# Patient Record
Sex: MALE | Race: WHITE | NOT HISPANIC OR LATINO | Employment: OTHER | URBAN - METROPOLITAN AREA
[De-identification: names, ages, dates, MRNs, and addresses within clinical notes are randomized per-mention and may not be internally consistent; named-entity substitution may affect disease eponyms.]

---

## 2023-12-08 DIAGNOSIS — I10 PRIMARY HYPERTENSION: Primary | ICD-10-CM

## 2023-12-08 RX ORDER — LISINOPRIL 10 MG/1
10 TABLET ORAL
COMMUNITY
Start: 2020-11-02 | End: 2023-12-08 | Stop reason: SDUPTHER

## 2023-12-08 RX ORDER — LISINOPRIL 10 MG/1
10 TABLET ORAL DAILY
COMMUNITY
End: 2024-02-06 | Stop reason: ALTCHOICE

## 2023-12-11 RX ORDER — LISINOPRIL 10 MG/1
10 TABLET ORAL
Qty: 90 TABLET | Refills: 3 | Status: SHIPPED | OUTPATIENT
Start: 2023-12-11

## 2024-02-06 ENCOUNTER — OFFICE VISIT (OUTPATIENT)
Dept: CARDIOLOGY | Facility: CLINIC | Age: 64
End: 2024-02-06
Payer: MEDICARE

## 2024-02-06 VITALS
SYSTOLIC BLOOD PRESSURE: 124 MMHG | HEIGHT: 72 IN | TEMPERATURE: 96.5 F | BODY MASS INDEX: 29.66 KG/M2 | WEIGHT: 219 LBS | HEART RATE: 59 BPM | DIASTOLIC BLOOD PRESSURE: 82 MMHG

## 2024-02-06 DIAGNOSIS — R93.3 ABNORMAL FINDINGS ON DIAGNOSTIC IMAGING OF OTHER PARTS OF DIGESTIVE TRACT: ICD-10-CM

## 2024-02-06 DIAGNOSIS — I48.91 ATRIAL FIBRILLATION, UNSPECIFIED TYPE (MULTI): ICD-10-CM

## 2024-02-06 DIAGNOSIS — R94.31 ABNORMAL EKG: Primary | ICD-10-CM

## 2024-02-06 PROCEDURE — 99214 OFFICE O/P EST MOD 30 MIN: CPT | Performed by: INTERNAL MEDICINE

## 2024-02-06 RX ORDER — IBUPROFEN 400 MG/1
400 TABLET ORAL EVERY 8 HOURS PRN
COMMUNITY
Start: 2018-08-16

## 2024-02-06 RX ORDER — IBUPROFEN 600 MG/1
600 TABLET ORAL EVERY 6 HOURS PRN
COMMUNITY
Start: 2023-06-01

## 2024-02-06 ASSESSMENT — PAIN SCALES - GENERAL: PAINLEVEL: 6

## 2024-02-06 NOTE — PROGRESS NOTES
1. A. fib/flutter in the setting of acute illness  2. Obstructive sleep apnea/CPAP  3. Degenerative disc disease  4. Osteoarthritis    Patient is a pleasant 63-year-old male who presents today for follow-up he has no complaints exertional chest pain or shortness of breath he states that winter months he is not as active though he states that he plans to have a few pounds of weight loss and plans to join gymnasium, he has no complaints of orthopnea PND he denies any complaints of palpitation and he continues to use CPAP for his obstructive sleep apnea.    BMI 29.7 previously at 28.7, blood pressure 124/82 mmHg and a heart rate of 59 bpm patient is a well-developed well-nourished male in no acute distress speaking full sentences no carotid bruits upstroke and volumes and central venous pressures are normal, heart rate and rhythm are regular S1-S2 are normal intensity no gallop or murmurs are appreciated lungs clear to auscultation abdomen is obese positive bowel sounds soft and nontender, and the lower extremities have no edema    February 2023  Total cholesterol 218, triglycerides 67, HDL 71, and LDL of 134    Atrial fibrillation flutter in the setting of an acute illness with no clinical recurrence his 2 weeks of continuous cardiac monitoring revealed no evidence of atrial fibrillation we will continue with clinical follow-up  Overweight heart healthy diet and weight loss activity and goal of 150 minutes of moderate exercise per week was discussed and details provided  Blood pressure acceptable under good control  Will provide patient with requisition for CBC CMP and a lipid panel will notify patient of the test results via the phone  Return to my clinic in 1 year.

## 2024-03-14 DIAGNOSIS — Z00.00 ENCOUNTER FOR MEDICARE ANNUAL WELLNESS EXAM: ICD-10-CM

## 2024-03-14 DIAGNOSIS — I48.0 PAROXYSMAL ATRIAL FIBRILLATION (MULTI): ICD-10-CM

## 2024-03-14 DIAGNOSIS — E78.5 HYPERLIPIDEMIA, UNSPECIFIED HYPERLIPIDEMIA TYPE: Primary | ICD-10-CM

## 2024-03-14 DIAGNOSIS — Z12.5 SCREENING FOR PROSTATE CANCER: ICD-10-CM

## 2024-03-14 DIAGNOSIS — I10 ESSENTIAL HYPERTENSION, BENIGN: ICD-10-CM

## 2024-03-14 DIAGNOSIS — D69.6 THROMBOCYTOPENIA (CMS-HCC): ICD-10-CM

## 2024-03-14 PROBLEM — G89.29 CHRONIC HEADACHES: Status: ACTIVE | Noted: 2024-03-14

## 2024-03-14 PROBLEM — Z86.0100 HISTORY OF COLONIC POLYPS: Status: ACTIVE | Noted: 2024-03-14

## 2024-03-14 PROBLEM — G47.00 INSOMNIA: Status: ACTIVE | Noted: 2024-03-14

## 2024-03-14 PROBLEM — M43.6 STIFFNESS OF CERVICAL SPINE: Status: ACTIVE | Noted: 2024-03-14

## 2024-03-14 PROBLEM — R01.1 MURMUR: Status: ACTIVE | Noted: 2023-05-18

## 2024-03-14 PROBLEM — Z86.79 HISTORY OF ATRIAL FIBRILLATION: Status: ACTIVE | Noted: 2024-03-14

## 2024-03-14 PROBLEM — R53.81 MALAISE: Status: ACTIVE | Noted: 2024-03-14

## 2024-03-14 PROBLEM — G47.33 OBSTRUCTIVE SLEEP APNEA SYNDROME: Status: ACTIVE | Noted: 2023-05-18

## 2024-03-14 PROBLEM — K21.00 GASTRO-ESOPHAGEAL REFLUX DISEASE WITH ESOPHAGITIS: Status: ACTIVE | Noted: 2024-03-14

## 2024-03-14 PROBLEM — M47.816 LUMBAR SPONDYLOSIS: Status: ACTIVE | Noted: 2024-03-14

## 2024-03-14 PROBLEM — E87.6 HYPOKALEMIA: Status: ACTIVE | Noted: 2019-08-22

## 2024-03-14 PROBLEM — M10.9 GOUT: Status: ACTIVE | Noted: 2024-03-14

## 2024-03-14 PROBLEM — R51.9 CHRONIC HEADACHES: Status: ACTIVE | Noted: 2024-03-14

## 2024-03-14 PROBLEM — Z86.19 HISTORY OF SEPSIS: Status: ACTIVE | Noted: 2024-03-14

## 2024-03-14 PROBLEM — R00.1 BRADYCARDIA: Status: ACTIVE | Noted: 2024-03-14

## 2024-03-14 PROBLEM — G43.909 MIGRAINE: Status: ACTIVE | Noted: 2024-03-14

## 2024-03-14 PROBLEM — C44.310 BASAL CELL CARCINOMA OF FACE: Status: ACTIVE | Noted: 2024-03-14

## 2024-03-14 PROBLEM — I50.21 ACUTE SYSTOLIC CONGESTIVE HEART FAILURE (MULTI): Status: ACTIVE | Noted: 2019-08-19

## 2024-03-14 PROBLEM — Z86.010 HISTORY OF COLONIC POLYPS: Status: ACTIVE | Noted: 2024-03-14

## 2024-03-22 ENCOUNTER — LAB (OUTPATIENT)
Dept: LAB | Facility: LAB | Age: 64
End: 2024-03-22
Payer: MEDICARE

## 2024-03-22 DIAGNOSIS — D69.6 THROMBOCYTOPENIA (CMS-HCC): ICD-10-CM

## 2024-03-22 DIAGNOSIS — Z00.00 ENCOUNTER FOR MEDICARE ANNUAL WELLNESS EXAM: ICD-10-CM

## 2024-03-22 DIAGNOSIS — E78.5 HYPERLIPIDEMIA, UNSPECIFIED HYPERLIPIDEMIA TYPE: ICD-10-CM

## 2024-03-22 DIAGNOSIS — I48.0 PAROXYSMAL ATRIAL FIBRILLATION (MULTI): ICD-10-CM

## 2024-03-22 DIAGNOSIS — I10 ESSENTIAL HYPERTENSION, BENIGN: ICD-10-CM

## 2024-03-22 DIAGNOSIS — Z12.5 SCREENING FOR PROSTATE CANCER: ICD-10-CM

## 2024-03-22 DIAGNOSIS — R93.3 ABNORMAL FINDINGS ON DIAGNOSTIC IMAGING OF OTHER PARTS OF DIGESTIVE TRACT: ICD-10-CM

## 2024-03-22 DIAGNOSIS — I48.91 ATRIAL FIBRILLATION, UNSPECIFIED TYPE (MULTI): ICD-10-CM

## 2024-03-22 DIAGNOSIS — R94.31 ABNORMAL EKG: ICD-10-CM

## 2024-03-22 LAB
ALBUMIN SERPL BCP-MCNC: 4.5 G/DL (ref 3.4–5)
ALP SERPL-CCNC: 52 U/L (ref 33–136)
ALT SERPL W P-5'-P-CCNC: 11 U/L (ref 10–52)
ANION GAP SERPL CALC-SCNC: 15 MMOL/L (ref 10–20)
AST SERPL W P-5'-P-CCNC: 14 U/L (ref 9–39)
BASOPHILS # BLD AUTO: 0.02 X10*3/UL (ref 0–0.1)
BASOPHILS NFR BLD AUTO: 0.4 %
BILIRUB SERPL-MCNC: 1.7 MG/DL (ref 0–1.2)
BUN SERPL-MCNC: 16 MG/DL (ref 6–23)
CALCIUM SERPL-MCNC: 9.9 MG/DL (ref 8.6–10.6)
CHLORIDE SERPL-SCNC: 102 MMOL/L (ref 98–107)
CHOLEST SERPL-MCNC: 208 MG/DL (ref 0–199)
CHOLESTEROL/HDL RATIO: 3.3
CO2 SERPL-SCNC: 27 MMOL/L (ref 21–32)
CREAT SERPL-MCNC: 0.8 MG/DL (ref 0.5–1.3)
EGFRCR SERPLBLD CKD-EPI 2021: >90 ML/MIN/1.73M*2
EOSINOPHIL # BLD AUTO: 0.12 X10*3/UL (ref 0–0.7)
EOSINOPHIL NFR BLD AUTO: 2.5 %
ERYTHROCYTE [DISTWIDTH] IN BLOOD BY AUTOMATED COUNT: 13.5 % (ref 11.5–14.5)
GLUCOSE SERPL-MCNC: 95 MG/DL (ref 74–99)
HCT VFR BLD AUTO: 46.3 % (ref 41–52)
HDLC SERPL-MCNC: 63.1 MG/DL
HGB BLD-MCNC: 15.5 G/DL (ref 13.5–17.5)
IMM GRANULOCYTES # BLD AUTO: 0.02 X10*3/UL (ref 0–0.7)
IMM GRANULOCYTES NFR BLD AUTO: 0.4 % (ref 0–0.9)
LDLC SERPL CALC-MCNC: 125 MG/DL
LYMPHOCYTES # BLD AUTO: 1.81 X10*3/UL (ref 1.2–4.8)
LYMPHOCYTES NFR BLD AUTO: 37.5 %
MCH RBC QN AUTO: 31.4 PG (ref 26–34)
MCHC RBC AUTO-ENTMCNC: 33.5 G/DL (ref 32–36)
MCV RBC AUTO: 94 FL (ref 80–100)
MONOCYTES # BLD AUTO: 0.44 X10*3/UL (ref 0.1–1)
MONOCYTES NFR BLD AUTO: 9.1 %
NEUTROPHILS # BLD AUTO: 2.42 X10*3/UL (ref 1.2–7.7)
NEUTROPHILS NFR BLD AUTO: 50.1 %
NON HDL CHOLESTEROL: 145 MG/DL (ref 0–149)
NRBC BLD-RTO: 0 /100 WBCS (ref 0–0)
PLATELET # BLD AUTO: 179 X10*3/UL (ref 150–450)
POTASSIUM SERPL-SCNC: 4.6 MMOL/L (ref 3.5–5.3)
PROT SERPL-MCNC: 7.4 G/DL (ref 6.4–8.2)
PSA SERPL-MCNC: 3.88 NG/ML
RBC # BLD AUTO: 4.94 X10*6/UL (ref 4.5–5.9)
SODIUM SERPL-SCNC: 139 MMOL/L (ref 136–145)
TRIGL SERPL-MCNC: 102 MG/DL (ref 0–149)
TSH SERPL-ACNC: 1.05 MIU/L (ref 0.44–3.98)
VLDL: 20 MG/DL (ref 0–40)
WBC # BLD AUTO: 4.8 X10*3/UL (ref 4.4–11.3)

## 2024-03-22 PROCEDURE — G0103 PSA SCREENING: HCPCS

## 2024-03-22 PROCEDURE — 84443 ASSAY THYROID STIM HORMONE: CPT

## 2024-03-22 PROCEDURE — 36415 COLL VENOUS BLD VENIPUNCTURE: CPT

## 2024-03-22 PROCEDURE — 80061 LIPID PANEL: CPT

## 2024-03-22 PROCEDURE — 85025 COMPLETE CBC W/AUTO DIFF WBC: CPT

## 2024-03-22 PROCEDURE — 80053 COMPREHEN METABOLIC PANEL: CPT

## 2024-03-28 ENCOUNTER — OFFICE VISIT (OUTPATIENT)
Dept: PRIMARY CARE | Facility: CLINIC | Age: 64
End: 2024-03-28
Payer: MEDICARE

## 2024-03-28 VITALS
HEART RATE: 74 BPM | SYSTOLIC BLOOD PRESSURE: 124 MMHG | TEMPERATURE: 98.4 F | DIASTOLIC BLOOD PRESSURE: 82 MMHG | BODY MASS INDEX: 31.21 KG/M2 | OXYGEN SATURATION: 96 % | HEIGHT: 70 IN | WEIGHT: 218 LBS

## 2024-03-28 DIAGNOSIS — I10 PRIMARY HYPERTENSION: ICD-10-CM

## 2024-03-28 DIAGNOSIS — Z00.00 HEALTH MAINTENANCE EXAMINATION: Primary | ICD-10-CM

## 2024-03-28 PROBLEM — I50.21 ACUTE SYSTOLIC CONGESTIVE HEART FAILURE (MULTI): Status: RESOLVED | Noted: 2019-08-19 | Resolved: 2024-03-28

## 2024-03-28 PROBLEM — D69.6 THROMBOCYTOPENIA (CMS-HCC): Status: RESOLVED | Noted: 2019-08-19 | Resolved: 2024-03-28

## 2024-03-28 PROCEDURE — G0439 PPPS, SUBSEQ VISIT: HCPCS | Performed by: FAMILY MEDICINE

## 2024-03-28 PROCEDURE — 1036F TOBACCO NON-USER: CPT | Performed by: FAMILY MEDICINE

## 2024-03-28 PROCEDURE — 3079F DIAST BP 80-89 MM HG: CPT | Performed by: FAMILY MEDICINE

## 2024-03-28 PROCEDURE — 3074F SYST BP LT 130 MM HG: CPT | Performed by: FAMILY MEDICINE

## 2024-03-28 RX ORDER — LISINOPRIL 10 MG/1
10 TABLET ORAL
Qty: 90 TABLET | Refills: 3 | Status: CANCELLED | OUTPATIENT
Start: 2024-03-28

## 2024-03-28 ASSESSMENT — PATIENT HEALTH QUESTIONNAIRE - PHQ9
SUM OF ALL RESPONSES TO PHQ9 QUESTIONS 1 AND 2: 0
1. LITTLE INTEREST OR PLEASURE IN DOING THINGS: NOT AT ALL
2. FEELING DOWN, DEPRESSED OR HOPELESS: NOT AT ALL

## 2024-03-28 ASSESSMENT — ACTIVITIES OF DAILY LIVING (ADL)
TAKING_MEDICATION: INDEPENDENT
DRESSING: INDEPENDENT
MANAGING_FINANCES: INDEPENDENT
DOING_HOUSEWORK: INDEPENDENT
BATHING: INDEPENDENT
GROCERY_SHOPPING: INDEPENDENT

## 2024-03-28 NOTE — PROGRESS NOTES
Subjective   Patient ID: Jacob Boone is a 63 y.o. male who presents for Medicare Annual Wellness Visit Subsequent (EP. Here for AMCR.).  HPI  Feels well, no specific complaints or concerns today.    [facility]     Sees chiropractor for his neck pain and low back pain    Dr. Guido is his cardiologist     Review of Systems  General: no fever or night sweats, no change in weight  Eyes: no vision disturbance  ENT: no hearing loss, no hoarseness, no mouth lesions, no sore throat, and no dysphagia  CV: no chest pain, no palpitations, no lower extremity edema  Resp: no shortness of breath, no cough  GI: no abdominal pain, no change in bowel habits  : no urinary problems  Skin; no rashes or new/changed skin lesions- has skin screening regularly [facility]   Neuro: no headache, no difficulty walking     Objective   Visit Vitals  /82   Pulse 74   Temp 36.9 °C (98.4 °F) (Oral)      Physical Exam  Appears well.    HEENT: OP clear. Sclera white. PERRL. EACs and TMs clear.    Neck: Supple, no masses, lymphadenopathy, or thyromegaly.    CVS: RRR, no murmurs. No peripheral edema.    Resp: Clear and equal breath sounds. Normal inspirations and expirations.    Abd: Soft, non-tender, no mass or HSM.    Male: Normal testes. No hernia.    Rectal: No mass. Guaiac negative stool. Normal prostate.    Skin: No suspicious lesions.    Assessment/Plan   Diagnoses and all orders for this visit:  Health maintenance examination  Primary hypertension      Anaid Thorpe MD  Family Medicine   [facility]

## 2024-11-06 ENCOUNTER — OFFICE VISIT (OUTPATIENT)
Dept: PRIMARY CARE | Facility: CLINIC | Age: 64
End: 2024-11-06
Payer: MEDICARE

## 2024-11-06 ENCOUNTER — TELEPHONE (OUTPATIENT)
Dept: PRIMARY CARE | Facility: CLINIC | Age: 64
End: 2024-11-06

## 2024-11-06 VITALS
BODY MASS INDEX: 31.71 KG/M2 | TEMPERATURE: 98.4 F | SYSTOLIC BLOOD PRESSURE: 142 MMHG | DIASTOLIC BLOOD PRESSURE: 82 MMHG | OXYGEN SATURATION: 95 % | WEIGHT: 221 LBS | HEART RATE: 54 BPM

## 2024-11-06 DIAGNOSIS — M54.42 ACUTE BILATERAL LOW BACK PAIN WITH BILATERAL SCIATICA: Primary | ICD-10-CM

## 2024-11-06 DIAGNOSIS — M54.41 ACUTE BILATERAL LOW BACK PAIN WITH BILATERAL SCIATICA: Primary | ICD-10-CM

## 2024-11-06 PROCEDURE — 3077F SYST BP >= 140 MM HG: CPT | Performed by: FAMILY MEDICINE

## 2024-11-06 PROCEDURE — 3079F DIAST BP 80-89 MM HG: CPT | Performed by: FAMILY MEDICINE

## 2024-11-06 PROCEDURE — 99213 OFFICE O/P EST LOW 20 MIN: CPT | Performed by: FAMILY MEDICINE

## 2024-11-06 RX ORDER — SULINDAC 200 MG/1
200 TABLET ORAL 2 TIMES DAILY
Qty: 30 TABLET | Refills: 0 | Status: SHIPPED | OUTPATIENT
Start: 2024-11-06 | End: 2025-01-05

## 2024-11-06 RX ORDER — PREDNISONE 20 MG/1
TABLET ORAL
Qty: 18 TABLET | Refills: 0 | Status: SHIPPED | OUTPATIENT
Start: 2024-11-06 | End: 2024-11-06

## 2024-11-06 RX ORDER — PREDNISONE 20 MG/1
TABLET ORAL
Qty: 18 TABLET | Refills: 0 | Status: SHIPPED | OUTPATIENT
Start: 2024-11-06

## 2024-11-06 RX ORDER — SULINDAC 200 MG/1
200 TABLET ORAL 2 TIMES DAILY
Qty: 30 TABLET | Refills: 0 | Status: SHIPPED | OUTPATIENT
Start: 2024-11-06 | End: 2024-11-06

## 2024-11-06 NOTE — PROGRESS NOTES
Subjective   Patient ID: Jacob Boone is a 64 y.o. male who presents for Back Pain (EP. Here for low back pain radiating down both legs. /).  HPI  C/o tightness down back of legs, been having a difficult time walking for the last month     Sees Dr. Jauregui once a week for manipulation     Has sauna and hot tub at home     [facility]es [facility] (for the last 6 mos or so)    Has seen Dr. Fredy Stratton in the past for cervical spine issues  (2018)  Review of Systems  Genl: The patient has been in good health without recent weight change, fevers, or night sweats  CVS: No chest pain, irregular heartbeat, shortness of breath, or swollen ankles  Resp: No cough or difficulty breathing  GI: No change in bowel habits or abdominal pain. No heartburn  : No urinary problems.   Objective   /82 (BP Location: Left arm)   Pulse 54   Temp 36.9 °C (98.4 °F) (Oral)   Wt 100 kg (221 lb)   SpO2 95%   BMI 31.71 kg/m²      Assessment/Plan   Diagnoses and all orders for this visit:  Acute bilateral low back pain with bilateral sciatica  -     Referral to Orthopaedic Surgery; Future  -     predniSONE (Deltasone) 20 mg tablet; Take 3 tabs for 3 days, 2 tabs for 3 days, and 1 tab for 3 days  -     sulindac (Clinoril) 200 mg tablet; Take 1 tablet (200 mg) by mouth 2 times a day. To start after finishing course of prednisone    Patient to take course of prednisone (x 9 days) and then start sulindac    Anaid Thorpe MD  Family Medicine   [facility]

## 2024-11-20 ENCOUNTER — OFFICE VISIT (OUTPATIENT)
Dept: ORTHOPEDIC SURGERY | Facility: CLINIC | Age: 64
End: 2024-11-20
Payer: MEDICARE

## 2024-11-20 VITALS — BODY MASS INDEX: 29.93 KG/M2 | WEIGHT: 221 LBS | HEIGHT: 72 IN

## 2024-11-20 DIAGNOSIS — M48.062 SPINAL STENOSIS OF LUMBAR REGION WITH NEUROGENIC CLAUDICATION: Primary | ICD-10-CM

## 2024-11-20 PROCEDURE — 1036F TOBACCO NON-USER: CPT | Performed by: PHYSICIAN ASSISTANT

## 2024-11-20 PROCEDURE — 3008F BODY MASS INDEX DOCD: CPT | Performed by: PHYSICIAN ASSISTANT

## 2024-11-20 PROCEDURE — 99204 OFFICE O/P NEW MOD 45 MIN: CPT | Performed by: PHYSICIAN ASSISTANT

## 2024-11-20 PROCEDURE — 99214 OFFICE O/P EST MOD 30 MIN: CPT | Performed by: PHYSICIAN ASSISTANT

## 2024-11-20 ASSESSMENT — PAIN - FUNCTIONAL ASSESSMENT: PAIN_FUNCTIONAL_ASSESSMENT: 0-10

## 2024-11-20 NOTE — PROGRESS NOTES
Jacob is a 64-year-old male reporting clinic today for evaluation of his chronic low back pain with radiculopathy.    His low back pain has been ongoing for approximately the last 4 years, he denies injury or trauma.  He has midline low back pain that radiates into beltline distribution.  His radicular symptoms started more recently, he has pain that radiates down his posterior legs into his calves, he describes this as severe spasming/tightening.  He is having increased difficulty walking distances, he is nearly cut his step down in half since the symptoms started.  He denies weakness, dragging or tripping over his feet.  He has full control of his bowel and bladder.    Treatment thus far has consisted of prednisone and sulindac prescribed by his PCP.  He has been seeing a chiropractor and going to a stretch lab as needed.  The stretch lab has significantly helped his neck pain.  He has a history of a previous cervical spine surgery with Dr. Stratton.    Family, social, and medical histories are obtained and reviewed.    ROS: All other systems have been reviewed and are negative except as previously noted in history of present illness.    Physical Exam:  Const: Well-appearing, well-nourished male in no distress.  Eyes: Normal appearing sclera and conjunctiva, no jaundice, pupils normal in appearance.  Resp: breathing comfortably, normal respiratory rate.  CV: No upper or lower extremity edema.  Musculoskeletal: Normal gait.  Lumbar ROM is supple.  Strength exam of the lower extremities reveals 5/5 strength in all major muscle groups.  Negative straight leg raise bilaterally.  Neuro: Sensation is intact and equal bilaterally. Deep tendon reflexes are normal and symmetric.  No clonus.  Skin: Intact without any lesions, normal turgor.  Psych: Alert and oriented x3, normal mood and affect.    The plan moving forward is to further evaluate his symptoms of lumbar stenosis with radiculopathy and neurogenic claudication.   Plain films ordered to assess for instability.  An MRI of the lumbar spine was ordered for lumbar stenosis.  He was also given a referral for some physical therapy.  He will follow-up with me once his imaging is complete to discuss further treatment options, possibly including injections versus surgical intervention.    **This note was dictated using speech recognition software and was not corrected for spelling or grammatical errors**

## 2024-12-03 ENCOUNTER — HOSPITAL ENCOUNTER (OUTPATIENT)
Dept: RADIOLOGY | Facility: CLINIC | Age: 64
Discharge: HOME | End: 2024-12-03
Payer: MEDICARE

## 2024-12-03 DIAGNOSIS — M48.062 SPINAL STENOSIS OF LUMBAR REGION WITH NEUROGENIC CLAUDICATION: ICD-10-CM

## 2024-12-03 PROCEDURE — 72148 MRI LUMBAR SPINE W/O DYE: CPT | Performed by: RADIOLOGY

## 2024-12-03 PROCEDURE — 72148 MRI LUMBAR SPINE W/O DYE: CPT

## 2024-12-09 ENCOUNTER — OFFICE VISIT (OUTPATIENT)
Dept: ORTHOPEDIC SURGERY | Facility: CLINIC | Age: 64
End: 2024-12-09
Payer: MEDICARE

## 2024-12-09 DIAGNOSIS — M48.062 NEUROGENIC CLAUDICATION DUE TO LUMBAR SPINAL STENOSIS: Primary | ICD-10-CM

## 2024-12-09 PROCEDURE — 99213 OFFICE O/P EST LOW 20 MIN: CPT | Performed by: ORTHOPAEDIC SURGERY

## 2024-12-09 PROCEDURE — 1036F TOBACCO NON-USER: CPT | Performed by: ORTHOPAEDIC SURGERY

## 2024-12-09 ASSESSMENT — PAIN - FUNCTIONAL ASSESSMENT: PAIN_FUNCTIONAL_ASSESSMENT: 0-10

## 2024-12-09 ASSESSMENT — PAIN DESCRIPTION - DESCRIPTORS: DESCRIPTORS: ACHING

## 2024-12-09 ASSESSMENT — PAIN SCALES - GENERAL: PAINLEVEL_OUTOF10: 4

## 2024-12-09 NOTE — PROGRESS NOTES
Patient returns for follow-up.  He is 7 years status post cervical discectomy and fusion with me, he did very well following that operation.  He was recently seen and worked up by Cassy Lo for lumbar stenosis, radicular pain in both legs with claudication.  This has been progressively worsening over the last several months.  His quality of life is really suffering.    He is scheduled to begin physical therapy soon.  He has not had any other formal treatment.    He does not have any focal deficits on exam.  Is able to heel/toe walk without difficulty.    I reviewed MRI personally.  This shows severe central stenosis at L4-5 with grade 1 spondylolisthesis.    We discussed conservative care at this point.  Recommend attempting physical therapy.  He can use over-the-counter medications as well for right now.  If he does not improve with physical therapy would be an excellent candidate for lumbar spine surgery.    If he does not do well with therapy would like to discuss surgery over the phone he can just call the office.  Otherwise I am happy to see him back on an as-needed basis.    *This note was dictated using speech recognition software and was not corrected for spelling or grammatical errors*

## 2024-12-13 DIAGNOSIS — M48.062 SPINAL STENOSIS OF LUMBAR REGION WITH NEUROGENIC CLAUDICATION: ICD-10-CM

## 2024-12-26 ENCOUNTER — APPOINTMENT (OUTPATIENT)
Dept: PHYSICAL THERAPY | Facility: CLINIC | Age: 64
End: 2024-12-26
Payer: MEDICARE

## 2025-01-06 ENCOUNTER — PREP FOR PROCEDURE (OUTPATIENT)
Dept: ORTHOPEDIC SURGERY | Facility: CLINIC | Age: 65
End: 2025-01-06
Payer: MEDICARE

## 2025-01-06 DIAGNOSIS — R79.1 ABNORMAL COAGULATION PROFILE: ICD-10-CM

## 2025-01-06 DIAGNOSIS — M48.062 NEUROGENIC CLAUDICATION DUE TO LUMBAR SPINAL STENOSIS: Primary | ICD-10-CM

## 2025-01-06 DIAGNOSIS — M54.16 LUMBAR RADICULOPATHY: ICD-10-CM

## 2025-01-06 RX ORDER — GABAPENTIN 300 MG/1
600 CAPSULE ORAL ONCE
OUTPATIENT
Start: 2025-01-06 | End: 2025-01-06

## 2025-01-06 RX ORDER — ACETAMINOPHEN 325 MG/1
975 TABLET ORAL ONCE
OUTPATIENT
Start: 2025-01-06 | End: 2025-01-06

## 2025-01-06 RX ORDER — CEFAZOLIN SODIUM 2 G/100ML
2 INJECTION, SOLUTION INTRAVENOUS ONCE
OUTPATIENT
Start: 2025-01-06 | End: 2025-01-06

## 2025-01-06 NOTE — H&P
Spoke with the patient on telephone today.  He has completed physical therapy.  He continues to have miserable radicular pain and neurogenic claudication.  His wife had to postpone her knee replacement surgery because he is incapable of taking care of her at this current symptoms at this point.    MRI demonstrates grade 1 spondylolisthesis at L4-5 with critical central stenosis.    I discussed the risks of surgery including bleeding, infection, paralysis, muscle weakness, CSF leak, bowel or bladder dysfunction, incomplete resolution of pain or numbness, DVT/PE, heart attack, stroke, and other unforeseen medical and anesthesia complications.  I also explained that the typical success rates for operation such as this fall in the 80-85% range, and that there is a small chance that there will be no improvement, or even less commonly, worsening of the preoperative symptoms.  He verbalized understanding of the risks, benefits, and alternatives to surgical treatment.  The plan will be for L4-5 minimally invasive decompression and interbody fusion.  Surgery was scheduled for January 23 at [facility].      *This note was dictated using speech recognition software and was not corrected for spelling or grammatical errors*

## 2025-01-17 ENCOUNTER — PRE-ADMISSION TESTING (OUTPATIENT)
Dept: PREADMISSION TESTING | Facility: HOSPITAL | Age: 65
End: 2025-01-17
Payer: MEDICARE

## 2025-01-17 VITALS
WEIGHT: 222 LBS | DIASTOLIC BLOOD PRESSURE: 79 MMHG | SYSTOLIC BLOOD PRESSURE: 158 MMHG | RESPIRATION RATE: 16 BRPM | BODY MASS INDEX: 30.07 KG/M2 | HEIGHT: 72 IN | TEMPERATURE: 97.2 F | HEART RATE: 57 BPM | OXYGEN SATURATION: 100 %

## 2025-01-17 DIAGNOSIS — R79.1 ABNORMAL COAGULATION PROFILE: ICD-10-CM

## 2025-01-17 DIAGNOSIS — M48.062 NEUROGENIC CLAUDICATION DUE TO LUMBAR SPINAL STENOSIS: ICD-10-CM

## 2025-01-17 DIAGNOSIS — R73.09 ABNORMAL GLUCOSE: ICD-10-CM

## 2025-01-17 DIAGNOSIS — M54.16 LUMBAR RADICULOPATHY: ICD-10-CM

## 2025-01-17 DIAGNOSIS — Z01.818 PREOP TESTING: Primary | ICD-10-CM

## 2025-01-17 LAB
ABO GROUP (TYPE) IN BLOOD: NORMAL
ANION GAP SERPL CALCULATED.3IONS-SCNC: 9 MMOL/L (ref 10–20)
ANTIBODY SCREEN: NORMAL
APTT PPP: 27.6 SECONDS (ref 22–32.5)
ATRIAL RATE: 54 BPM
BASOPHILS # BLD AUTO: 0.04 X10*3/UL (ref 0–0.1)
BASOPHILS NFR BLD AUTO: 0.9 %
BUN SERPL-MCNC: 18 MG/DL (ref 6–23)
CALCIUM SERPL-MCNC: 9.4 MG/DL (ref 8.6–10.3)
CHLORIDE SERPL-SCNC: 104 MMOL/L (ref 98–107)
CO2 SERPL-SCNC: 28 MMOL/L (ref 21–32)
CREAT SERPL-MCNC: 0.84 MG/DL (ref 0.5–1.3)
EGFRCR SERPLBLD CKD-EPI 2021: >90 ML/MIN/1.73M*2
EOSINOPHIL # BLD AUTO: 0.11 X10*3/UL (ref 0–0.7)
EOSINOPHIL NFR BLD AUTO: 2.4 %
ERYTHROCYTE [DISTWIDTH] IN BLOOD BY AUTOMATED COUNT: 13.2 % (ref 11.5–14.5)
EST. AVERAGE GLUCOSE BLD GHB EST-MCNC: 88 MG/DL
GLUCOSE SERPL-MCNC: 98 MG/DL (ref 74–99)
HBA1C MFR BLD: 4.7 %
HCT VFR BLD AUTO: 44 % (ref 41–52)
HGB BLD-MCNC: 14.9 G/DL (ref 13.5–17.5)
IMM GRANULOCYTES # BLD AUTO: 0.01 X10*3/UL (ref 0–0.7)
IMM GRANULOCYTES NFR BLD AUTO: 0.2 % (ref 0–0.9)
INR PPP: 1 (ref 0.9–1.2)
LYMPHOCYTES # BLD AUTO: 1.85 X10*3/UL (ref 1.2–4.8)
LYMPHOCYTES NFR BLD AUTO: 40.7 %
MCH RBC QN AUTO: 31.4 PG (ref 26–34)
MCHC RBC AUTO-ENTMCNC: 33.9 G/DL (ref 32–36)
MCV RBC AUTO: 93 FL (ref 80–100)
MONOCYTES # BLD AUTO: 0.46 X10*3/UL (ref 0.1–1)
MONOCYTES NFR BLD AUTO: 10.1 %
NEUTROPHILS # BLD AUTO: 2.07 X10*3/UL (ref 1.2–7.7)
NEUTROPHILS NFR BLD AUTO: 45.7 %
NRBC BLD-RTO: 0 /100 WBCS (ref 0–0)
P AXIS: 39 DEGREES
P OFFSET: 154 MS
P ONSET: 103 MS
PLATELET # BLD AUTO: 183 X10*3/UL (ref 150–450)
POTASSIUM SERPL-SCNC: 4.7 MMOL/L (ref 3.5–5.3)
PR INTERVAL: 222 MS
PROTHROMBIN TIME: 10 SECONDS (ref 9.3–12.7)
Q ONSET: 214 MS
QRS COUNT: 9 BEATS
QRS DURATION: 110 MS
QT INTERVAL: 382 MS
QTC CALCULATION(BAZETT): 362 MS
QTC FREDERICIA: 368 MS
R AXIS: 27 DEGREES
RBC # BLD AUTO: 4.75 X10*6/UL (ref 4.5–5.9)
RH FACTOR (ANTIGEN D): NORMAL
SODIUM SERPL-SCNC: 136 MMOL/L (ref 136–145)
T AXIS: 13 DEGREES
T OFFSET: 405 MS
VENTRICULAR RATE: 54 BPM
WBC # BLD AUTO: 4.5 X10*3/UL (ref 4.4–11.3)

## 2025-01-17 PROCEDURE — 93005 ELECTROCARDIOGRAM TRACING: CPT

## 2025-01-17 PROCEDURE — 83036 HEMOGLOBIN GLYCOSYLATED A1C: CPT | Mod: TRILAB

## 2025-01-17 PROCEDURE — 86900 BLOOD TYPING SEROLOGIC ABO: CPT

## 2025-01-17 PROCEDURE — 93010 ELECTROCARDIOGRAM REPORT: CPT | Performed by: INTERNAL MEDICINE

## 2025-01-17 PROCEDURE — 87081 CULTURE SCREEN ONLY: CPT | Mod: TRILAB

## 2025-01-17 PROCEDURE — 99203 OFFICE O/P NEW LOW 30 MIN: CPT | Performed by: NURSE PRACTITIONER

## 2025-01-17 PROCEDURE — 80048 BASIC METABOLIC PNL TOTAL CA: CPT

## 2025-01-17 PROCEDURE — 85730 THROMBOPLASTIN TIME PARTIAL: CPT

## 2025-01-17 PROCEDURE — 85025 COMPLETE CBC W/AUTO DIFF WBC: CPT

## 2025-01-17 PROCEDURE — 36415 COLL VENOUS BLD VENIPUNCTURE: CPT

## 2025-01-17 RX ORDER — CHLORHEXIDINE GLUCONATE ORAL RINSE 1.2 MG/ML
SOLUTION DENTAL
Qty: 473 ML | Refills: 0 | Status: SHIPPED | OUTPATIENT
Start: 2025-01-17 | End: 2025-01-24 | Stop reason: HOSPADM

## 2025-01-17 RX ORDER — IBUPROFEN 200 MG
400 TABLET ORAL EVERY 6 HOURS PRN
COMMUNITY
End: 2025-01-24 | Stop reason: HOSPADM

## 2025-01-17 ASSESSMENT — ENCOUNTER SYMPTOMS
NEUROLOGICAL NEGATIVE: 1
RESPIRATORY NEGATIVE: 1
CARDIOVASCULAR NEGATIVE: 1
ACTIVITY CHANGE: 1
EYES NEGATIVE: 1
HEMATOLOGIC/LYMPHATIC NEGATIVE: 1
PSYCHIATRIC NEGATIVE: 1
GASTROINTESTINAL NEGATIVE: 1
ENDOCRINE NEGATIVE: 1
BACK PAIN: 1
NECK PAIN: 1

## 2025-01-17 ASSESSMENT — DUKE ACTIVITY SCORE INDEX (DASI)
TOTAL_SCORE: 21.45
CAN YOU WALK A BLOCK OR TWO ON LEVEL GROUND: NO
CAN YOU DO YARD WORK LIKE RAKING LEAVES, WEEDING OR PUSHING A MOWER: NO
CAN YOU HAVE SEXUAL RELATIONS: YES
CAN YOU DO LIGHT WORK AROUND THE HOUSE LIKE DUSTING OR WASHING DISHES: YES
CAN YOU RUN A SHORT DISTANCE: NO
CAN YOU TAKE CARE OF YOURSELF (EAT, DRESS, BATHE, OR USE TOILET): YES
CAN YOU DO HEAVY WORK AROUND THE HOUSE LIKE SCRUBBING FLOORS OR LIFTING AND MOVING HEAVY FURNITURE: NO
CAN YOU DO MODERATE WORK AROUND THE HOUSE LIKE VACUUMING, SWEEPING FLOORS OR CARRYING GROCERIES: YES
CAN YOU PARTICIPATE IN STRENOUS SPORTS LIKE SWIMMING, SINGLES TENNIS, FOOTBALL, BASKETBALL, OR SKIING: NO
CAN YOU WALK INDOORS, SUCH AS AROUND YOUR HOUSE: YES
CAN YOU CLIMB A FLIGHT OF STAIRS OR WALK UP A HILL: YES
CAN YOU PARTICIPATE IN MODERATE RECREATIONAL ACTIVITIES LIKE GOLF, BOWLING, DANCING, DOUBLES TENNIS OR THROWING A BASEBALL OR FOOTBALL: NO
DASI METS SCORE: 5.4

## 2025-01-17 NOTE — H&P (VIEW-ONLY)
CPM/PAT Evaluation       Name: Jacob Boone (Jacob Boone)  /Age: 1960/64 y.o.     In-Person       Chief Complaint: chronic lower back pain     HPI    Pt is a 64 year old male with chronic lower back pain. Pt reports he has had lower back pain over this past year but the pain progressively worsened this past September. Pt describes the pain as a constant aching sharp pain that radiates down his bilateral legs. The pain often causes his legs to have painful spasms. The pain is aggravated with prolonged walking, prolonged sitting, and prolonged standing. Pt reports he occasionally takes Advil but denies significant improvement in his lower back pain. Pt denies falls but stated his legs will buckle and cause him to have near falls. Pt denies sudden loss of bowel or bladder function. Pt denies numbness and tingling in his bilateral feet. Pt was examined by his surgeon and has been scheduled for lumbar 4-5 minimally invasive decompression and interbody fusion. Pt denies CP, SOB, or dizziness.     Past Medical History:   Diagnosis Date    Atrial fibrillation and flutter 2019    in the setting of acute illness    Chronic lower back pain     Chronic neck pain     H/O vertigo     Heart murmur     History of sepsis     Hypertension     Migraines     PONV (postoperative nausea and vomiting)     after cervical spine surgery    Sleep apnea        Past Surgical History:   Procedure Laterality Date    CARDIOVERSION      FOOT NEUROMA SURGERY Right     NECK SURGERY  2018    Neck Surgery    OTHER SURGICAL HISTORY  2021    Hernia repair     Social History     Tobacco Use    Smoking status: Former     Types: Cigars     Quit date: 2005     Years since quittin.0     Passive exposure: Never    Smokeless tobacco: Never    Tobacco comments:     Occ cigars in past   Substance Use Topics    Alcohol use: Not Currently     Alcohol/week: 1.0 standard drink of alcohol     Types: 1 Shots of liquor per week      Social History     Substance and Sexual Activity   Drug Use Never     Patient  reports being sexually active.    No family history on file.    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 473 mL 0    ibuprofen 200 mg tablet Take 2 tablets (400 mg) by mouth every 6 hours if needed for mild pain (1 - 3).      lisinopril 10 mg tablet Take 1 tablet (10 mg) by mouth once daily. (Patient taking differently: Take 1 tablet (10 mg) by mouth once daily at bedtime.) 90 tablet 3    MAGNESIUM CITRATE ORAL Take 184 mg by mouth once daily at bedtime. With malate       No current facility-administered medications for this visit.     Review of Systems   Constitutional:  Positive for activity change.   HENT: Negative.     Eyes: Negative.    Respiratory: Negative.     Cardiovascular: Negative.    Gastrointestinal: Negative.    Endocrine: Negative.    Genitourinary: Negative.    Musculoskeletal:  Positive for back pain and neck pain.   Skin: Negative.    Neurological: Negative.    Hematological: Negative.    Psychiatric/Behavioral: Negative.       Physical Exam  Vitals reviewed.   Constitutional:       Appearance: Normal appearance.   HENT:      Head: Normocephalic and atraumatic.      Nose: Nose normal.      Mouth/Throat:      Mouth: Mucous membranes are moist.      Pharynx: Oropharynx is clear.   Eyes:      Extraocular Movements: Extraocular movements intact.      Conjunctiva/sclera: Conjunctivae normal.      Pupils: Pupils are equal, round, and reactive to light.   Neck:      Comments: Mildly limited neck ROM  Cardiovascular:      Rate and Rhythm: Normal rate and regular rhythm.      Pulses: Normal pulses.      Heart sounds: Normal heart sounds.   Pulmonary:      Effort: Pulmonary effort is normal.      Breath sounds: Normal breath sounds.    Abdominal:      General: Bowel sounds are normal.      Palpations: Abdomen is soft.   Musculoskeletal:      Cervical back: Neck supple.      Comments: Deferred examination of lower back to surgeon    Skin:     General: Skin is warm and dry.   Neurological:      General: No focal deficit present.      Mental Status: He is alert and oriented to person, place, and time. Mental status is at baseline.   Psychiatric:         Mood and Affect: Mood normal.         Behavior: Behavior normal.         Thought Content: Thought content normal.         Judgment: Judgment normal.          PAT AIRWAY:   Airway:     Mallampati::  II    TM distance::  >3 FB    Neck ROM::  Limited  normal      Visit Vitals  /79   Pulse 57   Temp 36.2 °C (97.2 °F) (Temporal)   Resp 16   Ht 1.829 m (6')   Wt 101 kg (222 lb)   SpO2 100%   BMI 30.11 kg/m²   Smoking Status Former   BSA 2.27 m²     ASA: 3  CHADS: 2.8%  RCRI: 0.9%  Ariscat: 1.6%  DASI Risk Score      Flowsheet Row Pre-Admission Testing from 1/17/2025 in [facility]   Can you take care of yourself (eat, dress, bathe, or use toilet)?  2.75 filed at 01/17/2025 0756   Can you walk indoors, such as around your house? 1.75 filed at 01/17/2025 0756   Can you walk a block or two on level ground?  0 filed at 01/17/2025 0756   Can you climb a flight of stairs or walk up a hill? 5.5 filed at 01/17/2025 0756   Can you run a short distance? 0 filed at 01/17/2025 0756   Can you do light work around the house like dusting or washing dishes? 2.7 filed at 01/17/2025 0756   Can you do moderate work around the house like vacuuming, sweeping floors or carrying groceries? 3.5 filed at 01/17/2025 0756   Can you do heavy work around the house like scrubbing floors or lifting and moving heavy furniture?  0 filed at 01/17/2025 0756   Can you do yard work like raking leaves, weeding or pushing a mower? 0 filed at 01/17/2025 0756   Can you have sexual relations? 5.25 filed at 01/17/2025 0756    Can you participate in moderate recreational activities like golf, bowling, dancing, doubles tennis or throwing a baseball or football? 0 filed at 01/17/2025 0756   Can you participate in strenous sports like swimming, singles tennis, football, basketball, or skiing? 0 filed at 01/17/2025 0756   DASI SCORE 21.45 filed at 01/17/2025 0756   METS Score (Will be calculated only when all the questions are answered) 5.4 filed at 01/17/2025 0756          Caprini DVT Assessment    No data to display       Modified Frailty Index    No data to display       CHADS2 Stroke Risk  Current as of just now        2.8% 3 to 100%: High Risk   2 to < 3%: Medium Risk   0 to < 2%: Low Risk     Last Change:           This score determines the patient's risk of having a stroke if the patient has atrial fibrillation.          Points Metrics   0 Has Congestive Heart Failure:  No     Patients with congestive heart failure get 1 point.    Current as of just now   1 Has Hypertension:  Yes     Patients with hypertension get 1 point.    Current as of just now   0 Age:  64     Patients who are 75 years of age or older get 1 point.    Current as of just now   0 Has Diabetes Excluding Gestational Diabetes:  No     Patients with diabetes get 1 point.    Current as of just now   0 Had Stroke:  No  Had TIA:  No  Had Thromboembolism:  No     Patients who have had a stroke, TIA, or thromboembolism get 2 points.    Current as of just now             Revised Cardiac Risk Index      Flowsheet Row Pre-Admission Testing from 1/17/20[facility] in [facility]   High-Risk Surgery (Intraperitoneal, Intrathoracic,Suprainguinal vascular) 1 filed at 01/17/2025 0824   History of ischemic heart disease (History of MI, History of positive exercuse test, Current chest paint considered due to myocardial ischemia, Use of nitrate therapy, ECG with pathological Q Waves) 0 filed at 01/17/2025 0824   History of congestive heart failure (pulmonary edemia, bilateral  rales or S3 gallop, Paroxysmal nocturnal dyspnea, CXR showing pulmonary vascular redistribution) 0 filed at 01/17/2025 0824   History of cerebrovascular disease (Prior TIA or stroke) 0 filed at 01/17/2025 0824   Pre-operative insulin treatment 0 filed at 01/17/2025 0824   Pre-operative creatinine>2 mg/dl 0 filed at 01/17/2025 0824   Revised Cardiac Risk Calculator 1 filed at 01/17/2025 0824          Apfel Simplified Score    No data to display       Risk Analysis Index Results This Encounter    No data found in the last 10 encounters.       Stop Bang Score      Flowsheet Row Pre-Admission Testing from [facility]/17/2025 in [facility]   Do you snore loudly? 0 filed at 01/17/2025 0755   Do you often feel tired or fatigued after your sleep? 1 filed at 01/17/2025 0755   Has anyone ever observed you stop breathing in your sleep? 1 filed at 01/17/2025 0755   Do you have or are you being treated for high blood pressure? 1 filed at 01/17/2025 0755   Recent BMI (Calculated) 30.1 filed at 01/17/2025 0755   Is BMI greater than 35 kg/m2? 0=No filed at 01/17/2025 0755   Age older than 50 years old? 1=Yes filed at 01/17/2025 0755   Is your neck circumference greater than 17 inches (Male) or 16 inches (Female)? 1 filed at 01/17/2025 0755   Gender - Male 1=Yes filed at 01/17/2025 0755   STOP-BANG Total Score 6 filed at 01/17/2025 0755          Prodigy: High Risk  Total Score: 16              Prodigy Age Score      Prodigy Gender Score          ARISCAT Score for Postoperative Pulmonary Complications    No data to display       Flood Perioperative Risk for Myocardial Infarction or Cardiac Arrest (KANWAL)    No data to display         Assessment and Plan:     Neurogenic claudication due to lumbar spinal stenosis, Lumbar radiculopathy: Lumbar 4-5 Minimally Invasive Decompression and Interbody Fusion.   HTN: Pt is taking lisinopril.  JOSEMANUEL: non-compliant with CPAP use.  Atrial fibrillation/Atrial flutter in the setting of acute  "illness (Sepsis in 2019). Pt had a cardioversion in 2020. Pt is followed by Dr. Guido. Per cardiologist note on 2/6/2024 \"no clinical recurrence his 2 weeks of continuous cardiac monitoring revealed no evidence of atrial fibrillation we will continue with clinical follow-up\"  H/o sepsis in 2019: Pt had cardiac arrest, required hemodialysis and had to be placed on a ventilator.   H/O cervical spine surgery  PONV after cervical spine surgery  BMI: 30.11    CBC, BMP, Coag, T&S, HgbA1C, MRSA swab collected in PAT.  EKG performed in PAT.    Sarita Reid, APRN-CNP          "

## 2025-01-17 NOTE — PREPROCEDURE INSTRUCTIONS
Preoperative Fasting Guidelines    Why must I stop eating and drinking near surgery time?  With sedation, food or liquid in your stomach can enter your lungs causing serious complications  Increases nausea and vomiting    When do I need to stop eating and drinking before my surgery?  Do not eat any food after midnight the night before your surgery/procedure.  You may have up to 13.5 ounces of clear liquid until TWO hours before your instructed arrival time to the hospital.  This includes water, black tea/coffee, (no milk or cream) apple juice, and electrolyte drinks (Gatorade)  You may chew gum until TWO hours before your surgery/procedure    PAT DISCHARGE INSTRUCTIONS    Please call the Same Day Surgery (SDS) Department of the hospital where your procedure will be performed after 2:00 PM the day before your surgery. If you are scheduled on a Monday, or a Tuesday following a Monday holiday, you will need to call on the last business day prior to your surgery.    [place] [id number] [phone]  Second Floor      Please let your surgeon know if:      You develop any open sores, shingles, burning or painful urination as these may increase your risk of an infection.   You no longer wish to have the surgery.   Any other personal circumstances change that may lead to the need to cancel or defer this surgery-such as being sick or getting admitted to any hospital within one week of your planned procedure.    Your contact details change, such as a change of address or phone number.    Starting now:     Please DO NOT drink alcohol or smoke for 24 hours before surgery. It is well known that quitting smoking can make a huge difference to your health and recovery from surgery. The longer you abstain from smoking, the better your chances of a healthy recovery. If you need help with quitting, call 6-371-QUIT-NOW to be connected to a trained counselor who will  discuss the best methods to help you quit.     Before your surgery:    Please stop all supplements 7 days prior to surgery. Or as directed by your surgeon.   Please stop taking NSAID pain medicine such as Advil and Motrin 7 days before surgery.    If you develop any fever, cough, cold, rashes, cuts, scratches, scrapes, urinary symptoms or infection anywhere on your body (including teeth and gums) prior to surgery, please call your surgeon’s office as soon as possible. This may require treatment to reduce the chance of cancellation on the day of surgery.    The day before your surgery:   DIET- Please follow the diet instructions at the top of your packet.   Get a good night’s rest.  Use the special soap for bathing if you have been instructed to use one.    Scheduled surgery times may change and you will be notified if this occurs - please check your personal voicemail for any updates.     On the morning of surgery:   Wear comfortable, loose fitting clothes which open in the front. Please do not wear moisturizers, creams, lotions, makeup or perfume.    Please bring with you to surgery:   Photo ID and insurance card   Current list of medicines and allergies   Pacemaker/ Defibrillator/Heart stent cards   CPAP machine and mask    Slings/ splints/ crutches   A copy of your complete advanced directive/DHPOA.    Please do NOT bring with you to surgery:   All jewelry and valuables should be left at home.   Prosthetic devices such as contact lenses, hearing aids, dentures, eyelash extensions, hairpins and body piercings must be removed prior to going in to the surgical suite.    After outpatient surgery:   A responsible adult MUST accompany you at the time of discharge and stay with you for 24 hours after your surgery. You may NOT drive yourself home after surgery.    Do not drive, operate machinery, make critical decisions or do activities that require co-ordination or balance until after a night’s sleep.   Do not drink  alcoholic beverages for 24 hours.   Instructions for resuming your medications will be provided by your surgeon.    CALL YOUR DOCTOR AFTER SURGERY IF YOU HAVE:     Chills and/or a fever of 101° F or higher.    Redness, swelling, pus or drainage from your surgical wound or a bad smell from the wound.    Lightheadedness, fainting or confusion.    Persistent vomiting (throwing up) and are not able to eat or drink for 12 hours.    Three or more loose, watery bowel movements in 24 hours (diarrhea).   Difficulty or pain while urinating( after non-urological surgery)    Pain and swelling in your legs, especially if it is only on one side.    Difficulty breathing or are breathing faster than normal.    Any new concerning symptoms.        Patient Information: Pre-Operative Infection Prevention Measures     Why did I have my nose, under my arms, and groin swabbed?  The purpose of the swab is to identify Staphylococcus aureus inside your nose or on your skin.  The swab was sent to the laboratory for culture.  A positive swab/culture for Staphylococcus aureus is called colonization or carriage.      What is Staphylococcus aureus?  Staphylococcus aureus, also known as “staph”, is a germ found on the skin or in the nose of healthy people.  Sometimes Staphylococcus aureus can get into the body and cause an infection.  This can be minor (such as pimples, boils, or other skin problems).  It might also be serious (such as a blood infection, pneumonia, or a surgical site infection).    What is Staphylococcus aureus colonization or carriage?  Colonization or carriage means that a person has the germ but is not sick from it.  These bacteria can be spread on the hands or when breathing or sneezing.    How is Staphylococcus aureus spread?  It is most often spread by close contact with a person or item that carries it.    What happens if my culture is positive for Staphylococcus aureus?  Your doctor/medical team will use this information  to guide any antibiotic treatment which may be necessary.  Regardless of the culture results, we will clean the inside of your nose with a betadine swab just before you have your surgery.      Will I get an infection if I have Staphylococcus aureus in my nose or on my skin?  Anyone can get an infection with Staphylococcus aureus.  However, the best way to reduce your risk of infection is to follow the instructions provided to you for the use of your CHG soap and dental rinse.        Patient Information: Oral/Dental Rinse    What is oral/dental rinse?   It is a mouthwash. It is a way of cleaning the mouth with a germ-killing solution before your surgery.  The solution contains chlorhexidine, commonly known as CHG.   It is used inside the mouth to kill a bacteria known as Staphylococcus aureus.  Let your doctor know if you are allergic to Chlorhexidine.    Why do I need to use CHG oral/dental rinse?  The CHG oral/dental rinse helps to kill a bacteria in your mouth known as Staphylococcus aureus.     This reduces the risk of infection at the surgical site.      Using your CHG oral/dental rinse  STEPS:  Use your CHG oral/dental rinse after you brush your teeth the night before (at bedtime) and the morning of your surgery.  Follow all directions on your prescription label.    Use the cap on the container to measure 15ml   Swish (gargle if you can) the mouthwash in your mouth for at least 30 seconds, (do not swallow) and spit out  After you use your CHG rinse, do not rinse your mouth with water, drink or eat.  Please refer to the prescription label for the appropriate time to resume oral intake      What side effects might I have using the CHG oral/dental rinse?  CHG rinse will stick to plaque on the teeth.  Brush and floss just before use.  Teeth brushing will help avoid staining of plaque during use.      Patient Information: Home Preoperative Antibacterial Shower      What is a home preoperative antibacterial  shower?  This shower is a way of cleaning the skin with a germ-killing solution before surgery.  The solution contains chlorhexidine, commonly known as CHG.  CHG is a skin cleanser with germ-killing ability.  Let your doctor know if you are allergic to chlorhexidine.    Why do I need to take a preoperative antibacterial shower?  Skin is not sterile.  It is best to try to make your skin as free of germs as possible before surgery.  Proper cleansing with a germ-killing soap before surgery can lower the number of germs on your skin.  This helps to reduce the risk of infection at the surgical site.  Following the instructions listed below will help you prepare your skin for surgery.      How do I use the solution?  Steps:  Begin using your CHG soap 5 days before your scheduled surgery on ________________________.    First, wash and rinse your hair using the CHG soap. Keep CHG soap away from ear canals and eyes.  Rinse completely, do not condition.  Hair extensions should be removed.  Wash your face with your normal soap and rinse.    Apply the CHG solution to a clean wet washcloth.  Turn the water off or move away from the water spray to avoid premature rinsing of the CHG soap as you are applying.   Firmly lather your entire body from the neck down.  Do not use on your face.  Pay special attention to the area(s) where your incision(s) will be located unless they are on your face.  Avoid scrubbing your skin too hard.  The important point is to have the CHG soap sit on your skin for 3 minutes.    When the 3 minutes are up, turn on the water and rinse the CHG solution off your body completely.   DO NOT wash with regular soap after you have used the CHG soap solution  Pat yourself dry with a clean, freshly-laundered towel.  DO NOT apply powders, deodorants, or lotions.  Dress in clean, freshly laundered nightclothes.    Be sure to sleep with clean, freshly laundered sheets.  Be aware that CHG will cause stains on fabrics;  if you wash them with bleach after use.  Rinse your washcloth and other linens that have contact with CHG completely.  Use only non-chlorine detergents to launder the items used.   The morning of surgery is the fifth day.  Repeat the above steps and dress in clean comfortable clothing     Whom should I contact if I have any questions regarding the use of CHG soap?  Call the [facility] at [phone] if you have any questions.                  Medication List            Accurate as of January 17, 2025  8:01 AM. Always use your most recent med list.                chlorhexidine 0.12 % solution  Commonly known as: Peridex  Use as directed.  Medication Adjustments for Surgery: Take/Use as prescribed     ibuprofen 200 mg tablet  Additional Medication Adjustments for Surgery: Take last dose 7 days before surgery     lisinopril 10 mg tablet  Take 1 tablet (10 mg) by mouth once daily.  Medication Adjustments for Surgery: Do Not take on the morning of surgery-HOLD 24 HOURS BEFORE     MAGNESIUM CITRATE ORAL  Notes to patient: Take evening dose before surgery.                              [facility]    Preoperative Deep Breathing Exercises    Why it is important to do deep breathing exercises before my surgery?  Deep breathing exercises strengthen your breathing muscles.  This helps you to recover after your surgery and decreases the chance of breathing complications.      How are the deep breathing exercises done?  Sit straight with your back supported.  Breathe in deeply and slowly through your nose. Your lower rib cage should expand and your abdomen may move forward.  Hold that breath for 3 to 5 seconds.  Breathe out through pursed lips, slowly and completely.  Rest and repeat 10 times every hour while awake.  Rest longer if you become dizzy or lightheaded.      Patient Information: Incentive Spirometer  What is an incentive  spirometer?  An incentive spirometer is a device used before and after surgery to “exercise” your lungs.  It helps you to take deeper breaths to expand your lungs.  Below is an example of a basic incentive spirometer.  The device you receive may differ slightly but they all function the same.    Why do I need to use an incentive spirometer?  Using your incentive spirometer prepares your lungs for surgery and helps prevent lung problems after surgery.  How do I use my incentive spirometer?  When you're using your incentive spirometer, make sure to breathe through your mouth. If you breathe through your nose, the incentive spirometer won't work properly. You can hold your nose if you have trouble.  If you feel dizzy at any time, stop and rest. Try again at a later time.  Follow the steps below:  Set up your incentive spirometer, expand the flexible tubing and connect to the outlet.  Sit upright in a chair or bed. Hold the incentive spirometer at eye level.   Put the mouthpiece in your mouth and close your lips tightly around it. Slowly breathe out (exhale) completely.  Breathe in (inhale) slowly through your mouth as deeply as you can. As you take a breath, you will see the piston rise inside the large column. While the piston rises, the indicator should move upwards. It should stay in between the 2 arrows (see Figure).  Try to get the piston as high as you can, while keeping the indicator between the arrows.   If the indicator doesn't stay between the arrows, you're breathing either too fast or too slow.  When you get it as high as you can, hold your breath for 10 seconds, or as long as possible. While you're holding your breath, the piston will slowly fall to the base of the spirometer.  Once the piston reaches the bottom of the spirometer, breathe out slowly through your mouth. Rest for a few seconds.  Repeat 10 times. Try to get the piston to the same level with each breath.  Repeat every hour while awake  You can  carefully clean the outside of the mouthpiece with an alcohol wipe or soap and water.        Patient and Family Education             Ways You Can Help Prevent Blood Clots             This handout explains some simple things you can do to help prevent blood clots.      Blood clots are blockages that can form in the body's veins. When a blood clot forms in your deep veins, it may be called a deep vein thrombosis, or DVT for short. Blood clots can happen in any part of the body where blood flows, but they are most common in the arms and legs. If a piece of a blood clot breaks free and travels to the lungs, it is called a pulmonary embolus (PE). A PE can be a very serious problem.         Being in the hospital or having surgery can raise your chances of getting a blood clot because you may not be well enough to move around as much as you normally do.         Ways you can help prevent blood clots in the hospital         Wearing SCDs. SCDs stands for Sequential Compression Devices.   SCDs are special sleeves that wrap around your legs  They attach to a pump that fills them with air to gently squeeze your legs every few minutes.   This helps return the blood in your legs to your heart.   SCDs should only be taken off when walking or bathing.   SCDs may not be comfortable, but they can help save your life.               Wearing compression stockings - if your doctor orders them. These special snug fitting stockings gently squeeze your legs to help blood flow.       Walking. Walking helps move the blood in your legs.   If your doctor says it is ok, try walking the halls at least   5 times a day. Ask us to help you get up, so you don't fall.      Taking any blood thinning medicines your doctor orders.        Page 1 of 2     [facility]; 3/23   Ways you can help prevent blood clots at home       Wearing compression stockings - if your doctor orders them. ? Walking - to help move the blood in your legs.       Taking  any blood thinning medicines your doctor orders.      Signs of a blood clot or PE      Tell your doctor or nurse know right away if you have of the problems listed below.    If you are at home, seek medical care right away. Call 911 for chest pain or problems breathing.               Signs of a blood clot (DVT) - such as pain,  swelling, redness or warmth in your arm or leg      Signs of a pulmonary embolism (PE) - such as chest     pain or feeling short of breath

## 2025-01-17 NOTE — CPM/PAT H&P
CPM/PAT Evaluation       Name: Jacob Boone (Jacob Boone)  /Age: 1960/64 y.o.     In-Person       Chief Complaint: chronic lower back pain     HPI    Pt is a 64 year old male with chronic lower back pain. Pt reports he has had lower back pain over this past year but the pain progressively worsened this past September. Pt describes the pain as a constant aching sharp pain that radiates down his bilateral legs. The pain often causes his legs to have painful spasms. The pain is aggravated with prolonged walking, prolonged sitting, and prolonged standing. Pt reports he occasionally takes Advil but denies significant improvement in his lower back pain. Pt denies falls but stated his legs will buckle and cause him to have near falls. Pt denies sudden loss of bowel or bladder function. Pt denies numbness and tingling in his bilateral feet. Pt was examined by his surgeon and has been scheduled for lumbar 4-5 minimally invasive decompression and interbody fusion. Pt denies CP, SOB, or dizziness.     Past Medical History:   Diagnosis Date    Atrial fibrillation and flutter 2019    in the setting of acute illness    Chronic lower back pain     Chronic neck pain     H/O vertigo     Heart murmur     History of sepsis     Hypertension     Migraines     PONV (postoperative nausea and vomiting)     after cervical spine surgery    Sleep apnea        Past Surgical History:   Procedure Laterality Date    CARDIOVERSION      FOOT NEUROMA SURGERY Right     NECK SURGERY  2018    Neck Surgery    OTHER SURGICAL HISTORY  2021    Hernia repair     Social History     Tobacco Use    Smoking status: Former     Types: Cigars     Quit date: 2005     Years since quittin.0     Passive exposure: Never    Smokeless tobacco: Never    Tobacco comments:     Occ cigars in past   Substance Use Topics    Alcohol use: Not Currently     Alcohol/week: 1.0 standard drink of alcohol     Types: 1 Shots of liquor per week      Social History     Substance and Sexual Activity   Drug Use Never     Patient  reports being sexually active.    No family history on file.    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 473 mL 0    ibuprofen 200 mg tablet Take 2 tablets (400 mg) by mouth every 6 hours if needed for mild pain (1 - 3).      lisinopril 10 mg tablet Take 1 tablet (10 mg) by mouth once daily. (Patient taking differently: Take 1 tablet (10 mg) by mouth once daily at bedtime.) 90 tablet 3    MAGNESIUM CITRATE ORAL Take 184 mg by mouth once daily at bedtime. With malate       No current facility-administered medications for this visit.     Review of Systems   Constitutional:  Positive for activity change.   HENT: Negative.     Eyes: Negative.    Respiratory: Negative.     Cardiovascular: Negative.    Gastrointestinal: Negative.    Endocrine: Negative.    Genitourinary: Negative.    Musculoskeletal:  Positive for back pain and neck pain.   Skin: Negative.    Neurological: Negative.    Hematological: Negative.    Psychiatric/Behavioral: Negative.       Physical Exam  Vitals reviewed.   Constitutional:       Appearance: Normal appearance.   HENT:      Head: Normocephalic and atraumatic.      Nose: Nose normal.      Mouth/Throat:      Mouth: Mucous membranes are moist.      Pharynx: Oropharynx is clear.   Eyes:      Extraocular Movements: Extraocular movements intact.      Conjunctiva/sclera: Conjunctivae normal.      Pupils: Pupils are equal, round, and reactive to light.   Neck:      Comments: Mildly limited neck ROM  Cardiovascular:      Rate and Rhythm: Normal rate and regular rhythm.      Pulses: Normal pulses.      Heart sounds: Normal heart sounds.   Pulmonary:      Effort: Pulmonary effort is normal.      Breath sounds: Normal breath sounds.    Abdominal:      General: Bowel sounds are normal.      Palpations: Abdomen is soft.   Musculoskeletal:      Cervical back: Neck supple.      Comments: Deferred examination of lower back to surgeon    Skin:     General: Skin is warm and dry.   Neurological:      General: No focal deficit present.      Mental Status: He is alert and oriented to person, place, and time. Mental status is at baseline.   Psychiatric:         Mood and Affect: Mood normal.         Behavior: Behavior normal.         Thought Content: Thought content normal.         Judgment: Judgment normal.          PAT AIRWAY:   Airway:     Mallampati::  II    TM distance::  >3 FB    Neck ROM::  Limited  normal      Visit Vitals  /79   Pulse 57   Temp 36.2 °C (97.2 °F) (Temporal)   Resp 16   Ht 1.829 m (6')   Wt 101 kg (222 lb)   SpO2 100%   BMI 30.11 kg/m²   Smoking Status Former   BSA 2.27 m²     ASA: 3  CHADS: 2.8%  RCRI: 0.9%  Ariscat: 1.6%  DASI Risk Score      Flowsheet Row Pre-Admission Testing from 1/17/2025 in [facility]   Can you take care of yourself (eat, dress, bathe, or use toilet)?  2.75 filed at 01/17/2025 0756   Can you walk indoors, such as around your house? 1.75 filed at 01/17/2025 0756   Can you walk a block or two on level ground?  0 filed at 01/17/2025 0756   Can you climb a flight of stairs or walk up a hill? 5.5 filed at 01/17/2025 0756   Can you run a short distance? 0 filed at 01/17/2025 0756   Can you do light work around the house like dusting or washing dishes? 2.7 filed at 01/17/2025 0756   Can you do moderate work around the house like vacuuming, sweeping floors or carrying groceries? 3.5 filed at 01/17/2025 0756   Can you do heavy work around the house like scrubbing floors or lifting and moving heavy furniture?  0 filed at 01/17/2025 0756   Can you do yard work like raking leaves, weeding or pushing a mower? 0 filed at 01/17/2025 0756   Can you have sexual relations? 5.25 filed at 01/17/2025 0756    Can you participate in moderate recreational activities like golf, bowling, dancing, doubles tennis or throwing a baseball or football? 0 filed at 01/17/2025 0756   Can you participate in strenous sports like swimming, singles tennis, football, basketball, or skiing? 0 filed at 01/17/2025 0756   DASI SCORE 21.45 filed at 01/17/2025 0756   METS Score (Will be calculated only when all the questions are answered) 5.4 filed at 01/17/2025 0756          Caprini DVT Assessment    No data to display       Modified Frailty Index    No data to display       CHADS2 Stroke Risk  Current as of just now        2.8% 3 to 100%: High Risk   2 to < 3%: Medium Risk   0 to < 2%: Low Risk     Last Change:           This score determines the patient's risk of having a stroke if the patient has atrial fibrillation.          Points Metrics   0 Has Congestive Heart Failure:  No     Patients with congestive heart failure get 1 point.    Current as of just now   1 Has Hypertension:  Yes     Patients with hypertension get 1 point.    Current as of just now   0 Age:  64     Patients who are 75 years of age or older get 1 point.    Current as of just now   0 Has Diabetes Excluding Gestational Diabetes:  No     Patients with diabetes get 1 point.    Current as of just now   0 Had Stroke:  No  Had TIA:  No  Had Thromboembolism:  No     Patients who have had a stroke, TIA, or thromboembolism get 2 points.    Current as of just now             Revised Cardiac Risk Index      Flowsheet Row Pre-Admission Testing from 1/17/2025 in [facility]   High-Risk Surgery (Intraperitoneal, Intrathoracic,Suprainguinal vascular) 1 filed at 01/17/2025 0824   History of ischemic heart disease (History of MI, History of positive exercuse test, Current chest paint considered due to myocardial ischemia, Use of nitrate therapy, ECG with pathological Q Waves) 0 filed at 01/17/2025 0824   History of congestive heart failure (pulmonary edemia, bilateral  rales or S3 gallop, Paroxysmal nocturnal dyspnea, CXR showing pulmonary vascular redistribution) 0 filed at 01/17/2025 0824   History of cerebrovascular disease (Prior TIA or stroke) 0 filed at 01/17/2025 0824   Pre-operative insulin treatment 0 filed at 01/17/2025 0824   Pre-operative creatinine>2 mg/dl 0 filed at 01/17/2025 0824   Revised Cardiac Risk Calculator 1 filed at 01/17/2025 0824          Apfel Simplified Score    No data to display       Risk Analysis Index Results This Encounter    No data found in the last 10 encounters.       Stop Bang Score      Flowsheet Row Pre-Admission Testing from 1/17/2025 in [facility]   Do you snore loudly? 0 filed at 01/17/2025 0755   Do you often feel tired or fatigued after your sleep? 1 filed at 01/17/2025 0755   Has anyone ever observed you stop breathing in your sleep? 1 filed at 01/17/2025 0755   Do you have or are you being treated for high blood pressure? 1 filed at 01/17/2025 0755   Recent BMI (Calculated) 30.1 filed at 01/17/2025 0755   Is BMI greater than 35 kg/m2? 0=No filed at 01/17/2025 0755   Age older than 50 years old? 1=Yes filed at 01/17/2025 0755   Is your neck circumference greater than 17 inches (Male) or 16 inches (Female)? 1 filed at 01/17/2025 0755   Gender - Male 1=Yes filed at 01/17/2025 0755   STOP-BANG Total Score 6 filed at 01/17/2025 0755          Prodigy: High Risk  Total Score: 16              Prodigy Age Score      Prodigy Gender Score          ARISCAT Score for Postoperative Pulmonary Complications    No data to display       Flood Perioperative Risk for Myocardial Infarction or Cardiac Arrest (KANWAL)    No data to display         Assessment and Plan:     Neurogenic claudication due to lumbar spinal stenosis, Lumbar radiculopathy: Lumbar 4-5 Minimally Invasive Decompression and Interbody Fusion.   HTN: Pt is taking lisinopril.  JOSEMANUEL: non-compliant with CPAP use.  Atrial fibrillation/Atrial flutter in the setting of acute  "illness (Sepsis in 2019). Pt had a cardioversion in 2020. Pt is followed by Dr. Guido. Per cardiologist note on 2/6/2024 \"no clinical recurrence his 2 weeks of continuous cardiac monitoring revealed no evidence of atrial fibrillation we will continue with clinical follow-up\"  H/o sepsis in 2019: Pt had cardiac arrest, required hemodialysis and had to be placed on a ventilator.   H/O cervical spine surgery  PONV after cervical spine surgery  BMI: 30.11    CBC, BMP, Coag, T&S, HgbA1C, MRSA swab collected in PAT.  EKG performed in PAT.    Sarita Reid, APRN-CNP          "

## 2025-01-19 LAB — STAPHYLOCOCCUS SPEC CULT: ABNORMAL

## 2025-01-22 ENCOUNTER — ANESTHESIA EVENT (OUTPATIENT)
Dept: OPERATING ROOM | Facility: HOSPITAL | Age: 65
End: 2025-01-22
Payer: MEDICARE

## 2025-01-23 ENCOUNTER — ANESTHESIA (OUTPATIENT)
Dept: OPERATING ROOM | Facility: HOSPITAL | Age: 65
End: 2025-01-23
Payer: MEDICARE

## 2025-01-23 ENCOUNTER — HOSPITAL ENCOUNTER (OUTPATIENT)
Facility: HOSPITAL | Age: 65
Discharge: HOME | End: 2025-01-24
Attending: ORTHOPAEDIC SURGERY | Admitting: ORTHOPAEDIC SURGERY
Payer: MEDICARE

## 2025-01-23 ENCOUNTER — APPOINTMENT (OUTPATIENT)
Dept: RADIOLOGY | Facility: HOSPITAL | Age: 65
End: 2025-01-23
Payer: MEDICARE

## 2025-01-23 DIAGNOSIS — M48.062 NEUROGENIC CLAUDICATION DUE TO LUMBAR SPINAL STENOSIS: Primary | ICD-10-CM

## 2025-01-23 DIAGNOSIS — M48.062 SPINAL STENOSIS, LUMBAR REGION, WITH NEUROGENIC CLAUDICATION: ICD-10-CM

## 2025-01-23 DIAGNOSIS — M54.16 LUMBAR RADICULOPATHY: ICD-10-CM

## 2025-01-23 LAB
ABO GROUP (TYPE) IN BLOOD: NORMAL
RH FACTOR (ANTIGEN D): NORMAL

## 2025-01-23 PROCEDURE — 36415 COLL VENOUS BLD VENIPUNCTURE: CPT | Performed by: ORTHOPAEDIC SURGERY

## 2025-01-23 PROCEDURE — 2500000004 HC RX 250 GENERAL PHARMACY W/ HCPCS (ALT 636 FOR OP/ED): Mod: JZ | Performed by: ORTHOPAEDIC SURGERY

## 2025-01-23 PROCEDURE — 2500000001 HC RX 250 WO HCPCS SELF ADMINISTERED DRUGS (ALT 637 FOR MEDICARE OP): Performed by: PHYSICIAN ASSISTANT

## 2025-01-23 PROCEDURE — 97165 OT EVAL LOW COMPLEX 30 MIN: CPT | Mod: GO

## 2025-01-23 PROCEDURE — A4649 SURGICAL SUPPLIES: HCPCS | Performed by: ORTHOPAEDIC SURGERY

## 2025-01-23 PROCEDURE — 63052 LAM FACETC/FRMT ARTHRD LUM 1: CPT | Performed by: ORTHOPAEDIC SURGERY

## 2025-01-23 PROCEDURE — 7100000002 HC RECOVERY ROOM TIME - EACH INCREMENTAL 1 MINUTE: Performed by: ORTHOPAEDIC SURGERY

## 2025-01-23 PROCEDURE — 2720000007 HC OR 272 NO HCPCS: Performed by: ORTHOPAEDIC SURGERY

## 2025-01-23 PROCEDURE — 99221 1ST HOSP IP/OBS SF/LOW 40: CPT | Performed by: NURSE PRACTITIONER

## 2025-01-23 PROCEDURE — 97161 PT EVAL LOW COMPLEX 20 MIN: CPT | Mod: GP

## 2025-01-23 PROCEDURE — 2500000005 HC RX 250 GENERAL PHARMACY W/O HCPCS: Performed by: ORTHOPAEDIC SURGERY

## 2025-01-23 PROCEDURE — 22630 ARTHRD PST TQ 1NTRSPC LUM: CPT | Performed by: PHYSICIAN ASSISTANT

## 2025-01-23 PROCEDURE — 2500000004 HC RX 250 GENERAL PHARMACY W/ HCPCS (ALT 636 FOR OP/ED): Performed by: NURSE ANESTHETIST, CERTIFIED REGISTERED

## 2025-01-23 PROCEDURE — A22630 PR ARTHRODESIS POSTERIOR INTERBODY LUMBAR: Performed by: ANESTHESIOLOGY

## 2025-01-23 PROCEDURE — 22853 INSJ BIOMECHANICAL DEVICE: CPT | Performed by: ORTHOPAEDIC SURGERY

## 2025-01-23 PROCEDURE — C1713 ANCHOR/SCREW BN/BN,TIS/BN: HCPCS | Performed by: ORTHOPAEDIC SURGERY

## 2025-01-23 PROCEDURE — 22630 ARTHRD PST TQ 1NTRSPC LUM: CPT | Performed by: ORTHOPAEDIC SURGERY

## 2025-01-23 PROCEDURE — 2780000003 HC OR 278 NO HCPCS: Performed by: ORTHOPAEDIC SURGERY

## 2025-01-23 PROCEDURE — 3700000001 HC GENERAL ANESTHESIA TIME - INITIAL BASE CHARGE: Performed by: ORTHOPAEDIC SURGERY

## 2025-01-23 PROCEDURE — 7100000011 HC EXTENDED STAY RECOVERY HOURLY - NURSING UNIT

## 2025-01-23 PROCEDURE — 2500000004 HC RX 250 GENERAL PHARMACY W/ HCPCS (ALT 636 FOR OP/ED): Performed by: PHYSICIAN ASSISTANT

## 2025-01-23 PROCEDURE — 20930 SP BONE ALGRFT MORSEL ADD-ON: CPT | Performed by: ORTHOPAEDIC SURGERY

## 2025-01-23 PROCEDURE — 3600000018 HC OR TIME - INITIAL BASE CHARGE - PROCEDURE LEVEL SIX: Performed by: ORTHOPAEDIC SURGERY

## 2025-01-23 PROCEDURE — 63052 LAM FACETC/FRMT ARTHRD LUM 1: CPT | Performed by: PHYSICIAN ASSISTANT

## 2025-01-23 PROCEDURE — 3700000002 HC GENERAL ANESTHESIA TIME - EACH INCREMENTAL 1 MINUTE: Performed by: ORTHOPAEDIC SURGERY

## 2025-01-23 PROCEDURE — 22840 INSERT SPINE FIXATION DEVICE: CPT | Performed by: ORTHOPAEDIC SURGERY

## 2025-01-23 PROCEDURE — A22630 PR ARTHRODESIS POSTERIOR INTERBODY LUMBAR: Performed by: NURSE ANESTHETIST, CERTIFIED REGISTERED

## 2025-01-23 PROCEDURE — 2500000001 HC RX 250 WO HCPCS SELF ADMINISTERED DRUGS (ALT 637 FOR MEDICARE OP): Performed by: ORTHOPAEDIC SURGERY

## 2025-01-23 PROCEDURE — 22840 INSERT SPINE FIXATION DEVICE: CPT | Performed by: PHYSICIAN ASSISTANT

## 2025-01-23 PROCEDURE — 22853 INSJ BIOMECHANICAL DEVICE: CPT | Performed by: PHYSICIAN ASSISTANT

## 2025-01-23 PROCEDURE — 7100000001 HC RECOVERY ROOM TIME - INITIAL BASE CHARGE: Performed by: ORTHOPAEDIC SURGERY

## 2025-01-23 PROCEDURE — 2500000004 HC RX 250 GENERAL PHARMACY W/ HCPCS (ALT 636 FOR OP/ED): Performed by: ANESTHESIOLOGY

## 2025-01-23 PROCEDURE — 3600000017 HC OR TIME - EACH INCREMENTAL 1 MINUTE - PROCEDURE LEVEL SIX: Performed by: ORTHOPAEDIC SURGERY

## 2025-01-23 PROCEDURE — 2500000005 HC RX 250 GENERAL PHARMACY W/O HCPCS: Performed by: NURSE ANESTHETIST, CERTIFIED REGISTERED

## 2025-01-23 DEVICE — SCREW, RELINE LOCK, 5.5MM OPEN TULIP: Type: IMPLANTABLE DEVICE | Site: SPINE LUMBAR | Status: FUNCTIONAL

## 2025-01-23 DEVICE — SCREW, RELINE MAS RED, 6.5X50MM POLY 2C: Type: IMPLANTABLE DEVICE | Site: SPINE LUMBAR | Status: FUNCTIONAL

## 2025-01-23 DEVICE — K WIRE, NITINOL, PRECEPT, BEVEL TIP: Type: IMPLANTABLE DEVICE | Site: SPINE LUMBAR | Status: FUNCTIONAL

## 2025-01-23 DEVICE — NV I-PAS III, BEVELED TIP STERILE: Type: IMPLANTABLE DEVICE | Site: SPINE LUMBAR | Status: FUNCTIONAL

## 2025-01-23 DEVICE — IMPLANTABLE DEVICE
Type: IMPLANTABLE DEVICE | Site: SPINE LUMBAR | Status: FUNCTIONAL
Brand: SURGIFLO® HEMOSTATIC MATRIX KIT WITH THROMBIN

## 2025-01-23 DEVICE — IMPLANTABLE DEVICE: Type: IMPLANTABLE DEVICE | Site: SPINE LUMBAR | Status: FUNCTIONAL

## 2025-01-23 DEVICE — RELINE MAS MOD, REDUCTION EXTENSION: Type: IMPLANTABLE DEVICE | Site: SPINE LUMBAR | Status: FUNCTIONAL

## 2025-01-23 DEVICE — ROD, RELINE MAS, 5.5 X 45MM: Type: IMPLANTABLE DEVICE | Site: SPINE LUMBAR | Status: FUNCTIONAL

## 2025-01-23 DEVICE — MAXCESS MAS TLIF 2 KIT ACCESS
Type: IMPLANTABLE DEVICE | Site: SPINE LUMBAR | Status: NON-FUNCTIONAL
Brand: MAXCESS

## 2025-01-23 RX ORDER — OXYCODONE HYDROCHLORIDE 5 MG/1
5 TABLET ORAL EVERY 4 HOURS PRN
Qty: 42 TABLET | Refills: 0 | Status: SHIPPED | OUTPATIENT
Start: 2025-01-23 | End: 2025-01-30

## 2025-01-23 RX ORDER — POLYETHYLENE GLYCOL 3350 17 G/17G
17 POWDER, FOR SOLUTION ORAL 2 TIMES DAILY PRN
COMMUNITY
Start: 2025-01-23 | End: 2025-02-22

## 2025-01-23 RX ORDER — METHOCARBAMOL 500 MG/1
TABLET, FILM COATED ORAL
Qty: 60 TABLET | Refills: 2 | Status: SHIPPED | OUTPATIENT
Start: 2025-01-23

## 2025-01-23 RX ORDER — FENTANYL CITRATE 50 UG/ML
50 INJECTION, SOLUTION INTRAMUSCULAR; INTRAVENOUS EVERY 5 MIN PRN
Status: DISCONTINUED | OUTPATIENT
Start: 2025-01-23 | End: 2025-01-23 | Stop reason: HOSPADM

## 2025-01-23 RX ORDER — DEXTROSE 50 % IN WATER (D50W) INTRAVENOUS SYRINGE
12.5
Status: DISCONTINUED | OUTPATIENT
Start: 2025-01-23 | End: 2025-01-24 | Stop reason: HOSPADM

## 2025-01-23 RX ORDER — ACETAMINOPHEN 325 MG/1
975 TABLET ORAL EVERY 8 HOURS
Status: DISCONTINUED | OUTPATIENT
Start: 2025-01-23 | End: 2025-01-24 | Stop reason: HOSPADM

## 2025-01-23 RX ORDER — CEFAZOLIN SODIUM 2 G/100ML
2 INJECTION, SOLUTION INTRAVENOUS ONCE
Status: COMPLETED | OUTPATIENT
Start: 2025-01-23 | End: 2025-01-23

## 2025-01-23 RX ORDER — MEPERIDINE HYDROCHLORIDE 25 MG/ML
12.5 INJECTION INTRAMUSCULAR; INTRAVENOUS; SUBCUTANEOUS EVERY 10 MIN PRN
Status: DISCONTINUED | OUTPATIENT
Start: 2025-01-23 | End: 2025-01-23 | Stop reason: HOSPADM

## 2025-01-23 RX ORDER — POLYETHYLENE GLYCOL 3350 17 G/17G
17 POWDER, FOR SOLUTION ORAL DAILY
Status: DISCONTINUED | OUTPATIENT
Start: 2025-01-23 | End: 2025-01-24 | Stop reason: HOSPADM

## 2025-01-23 RX ORDER — GABAPENTIN 300 MG/1
600 CAPSULE ORAL ONCE
Status: COMPLETED | OUTPATIENT
Start: 2025-01-23 | End: 2025-01-23

## 2025-01-23 RX ORDER — PROCHLORPERAZINE MALEATE 10 MG
10 TABLET ORAL EVERY 6 HOURS PRN
Status: DISCONTINUED | OUTPATIENT
Start: 2025-01-23 | End: 2025-01-24 | Stop reason: HOSPADM

## 2025-01-23 RX ORDER — FENTANYL CITRATE 50 UG/ML
INJECTION, SOLUTION INTRAMUSCULAR; INTRAVENOUS AS NEEDED
Status: DISCONTINUED | OUTPATIENT
Start: 2025-01-23 | End: 2025-01-23

## 2025-01-23 RX ORDER — KETOROLAC TROMETHAMINE 30 MG/ML
INJECTION, SOLUTION INTRAMUSCULAR; INTRAVENOUS AS NEEDED
Status: DISCONTINUED | OUTPATIENT
Start: 2025-01-23 | End: 2025-01-23

## 2025-01-23 RX ORDER — METHOCARBAMOL 500 MG/1
1000 TABLET, FILM COATED ORAL 3 TIMES DAILY
Status: DISCONTINUED | OUTPATIENT
Start: 2025-01-23 | End: 2025-01-24 | Stop reason: HOSPADM

## 2025-01-23 RX ORDER — ACETAMINOPHEN 500 MG
1000 TABLET ORAL EVERY 8 HOURS
COMMUNITY
Start: 2025-01-23 | End: 2025-02-06

## 2025-01-23 RX ORDER — OXYCODONE HYDROCHLORIDE 5 MG/1
5 TABLET ORAL EVERY 4 HOURS PRN
Status: DISCONTINUED | OUTPATIENT
Start: 2025-01-23 | End: 2025-01-24 | Stop reason: HOSPADM

## 2025-01-23 RX ORDER — NALOXONE HYDROCHLORIDE 0.4 MG/ML
0.2 INJECTION, SOLUTION INTRAMUSCULAR; INTRAVENOUS; SUBCUTANEOUS EVERY 5 MIN PRN
Status: DISCONTINUED | OUTPATIENT
Start: 2025-01-23 | End: 2025-01-24 | Stop reason: HOSPADM

## 2025-01-23 RX ORDER — MIDAZOLAM HYDROCHLORIDE 1 MG/ML
1 INJECTION, SOLUTION INTRAMUSCULAR; INTRAVENOUS ONCE AS NEEDED
Status: COMPLETED | OUTPATIENT
Start: 2025-01-23 | End: 2025-01-23

## 2025-01-23 RX ORDER — ONDANSETRON HYDROCHLORIDE 2 MG/ML
INJECTION, SOLUTION INTRAVENOUS AS NEEDED
Status: DISCONTINUED | OUTPATIENT
Start: 2025-01-23 | End: 2025-01-23

## 2025-01-23 RX ORDER — HYDROMORPHONE HYDROCHLORIDE 0.2 MG/ML
0.2 INJECTION INTRAMUSCULAR; INTRAVENOUS; SUBCUTANEOUS EVERY 5 MIN PRN
Status: DISCONTINUED | OUTPATIENT
Start: 2025-01-23 | End: 2025-01-23 | Stop reason: HOSPADM

## 2025-01-23 RX ORDER — PROCHLORPERAZINE 25 MG/1
25 SUPPOSITORY RECTAL EVERY 12 HOURS PRN
Status: DISCONTINUED | OUTPATIENT
Start: 2025-01-23 | End: 2025-01-24 | Stop reason: HOSPADM

## 2025-01-23 RX ORDER — ROCURONIUM BROMIDE 10 MG/ML
INJECTION, SOLUTION INTRAVENOUS AS NEEDED
Status: DISCONTINUED | OUTPATIENT
Start: 2025-01-23 | End: 2025-01-23

## 2025-01-23 RX ORDER — PROCHLORPERAZINE EDISYLATE 5 MG/ML
10 INJECTION INTRAMUSCULAR; INTRAVENOUS EVERY 6 HOURS PRN
Status: DISCONTINUED | OUTPATIENT
Start: 2025-01-23 | End: 2025-01-24 | Stop reason: HOSPADM

## 2025-01-23 RX ORDER — METHOCARBAMOL 100 MG/ML
INJECTION, SOLUTION INTRAMUSCULAR; INTRAVENOUS AS NEEDED
Status: DISCONTINUED | OUTPATIENT
Start: 2025-01-23 | End: 2025-01-23

## 2025-01-23 RX ORDER — LIDOCAINE HYDROCHLORIDE 10 MG/ML
INJECTION, SOLUTION INFILTRATION; PERINEURAL AS NEEDED
Status: DISCONTINUED | OUTPATIENT
Start: 2025-01-23 | End: 2025-01-23 | Stop reason: SDUPTHER

## 2025-01-23 RX ORDER — CEFAZOLIN SODIUM 2 G/100ML
2 INJECTION, SOLUTION INTRAVENOUS EVERY 8 HOURS
Status: COMPLETED | OUTPATIENT
Start: 2025-01-23 | End: 2025-01-24

## 2025-01-23 RX ORDER — ALBUTEROL SULFATE 0.83 MG/ML
2.5 SOLUTION RESPIRATORY (INHALATION) ONCE
Status: DISCONTINUED | OUTPATIENT
Start: 2025-01-23 | End: 2025-01-23 | Stop reason: HOSPADM

## 2025-01-23 RX ORDER — ONDANSETRON HYDROCHLORIDE 2 MG/ML
4 INJECTION, SOLUTION INTRAVENOUS EVERY 8 HOURS PRN
Status: DISCONTINUED | OUTPATIENT
Start: 2025-01-23 | End: 2025-01-24 | Stop reason: HOSPADM

## 2025-01-23 RX ORDER — LISINOPRIL 10 MG/1
10 TABLET ORAL DAILY
Status: DISCONTINUED | OUTPATIENT
Start: 2025-01-23 | End: 2025-01-24 | Stop reason: HOSPADM

## 2025-01-23 RX ORDER — ONDANSETRON HYDROCHLORIDE 2 MG/ML
4 INJECTION, SOLUTION INTRAVENOUS ONCE AS NEEDED
Status: DISCONTINUED | OUTPATIENT
Start: 2025-01-23 | End: 2025-01-23 | Stop reason: HOSPADM

## 2025-01-23 RX ORDER — TRANEXAMIC ACID 100 MG/ML
INJECTION, SOLUTION INTRAVENOUS AS NEEDED
Status: DISCONTINUED | OUTPATIENT
Start: 2025-01-23 | End: 2025-01-23

## 2025-01-23 RX ORDER — MIDAZOLAM HYDROCHLORIDE 1 MG/ML
INJECTION, SOLUTION INTRAMUSCULAR; INTRAVENOUS AS NEEDED
Status: DISCONTINUED | OUTPATIENT
Start: 2025-01-23 | End: 2025-01-23

## 2025-01-23 RX ORDER — CALCIUM CARBONATE 200(500)MG
1000 TABLET,CHEWABLE ORAL 4 TIMES DAILY PRN
Status: DISCONTINUED | OUTPATIENT
Start: 2025-01-23 | End: 2025-01-24 | Stop reason: HOSPADM

## 2025-01-23 RX ORDER — SODIUM CHLORIDE, SODIUM LACTATE, POTASSIUM CHLORIDE, CALCIUM CHLORIDE 600; 310; 30; 20 MG/100ML; MG/100ML; MG/100ML; MG/100ML
100 INJECTION, SOLUTION INTRAVENOUS CONTINUOUS
Status: ACTIVE | OUTPATIENT
Start: 2025-01-23 | End: 2025-01-24

## 2025-01-23 RX ORDER — ONDANSETRON 4 MG/1
4 TABLET, ORALLY DISINTEGRATING ORAL EVERY 8 HOURS PRN
Status: DISCONTINUED | OUTPATIENT
Start: 2025-01-23 | End: 2025-01-24 | Stop reason: HOSPADM

## 2025-01-23 RX ORDER — LIDOCAINE HYDROCHLORIDE 10 MG/ML
0.1 INJECTION, SOLUTION INFILTRATION; PERINEURAL ONCE
Status: DISCONTINUED | OUTPATIENT
Start: 2025-01-23 | End: 2025-01-23 | Stop reason: HOSPADM

## 2025-01-23 RX ORDER — PROPOFOL 10 MG/ML
INJECTION, EMULSION INTRAVENOUS AS NEEDED
Status: DISCONTINUED | OUTPATIENT
Start: 2025-01-23 | End: 2025-01-23

## 2025-01-23 RX ORDER — SODIUM CHLORIDE, SODIUM LACTATE, POTASSIUM CHLORIDE, CALCIUM CHLORIDE 600; 310; 30; 20 MG/100ML; MG/100ML; MG/100ML; MG/100ML
100 INJECTION, SOLUTION INTRAVENOUS CONTINUOUS
Status: DISCONTINUED | OUTPATIENT
Start: 2025-01-23 | End: 2025-01-23 | Stop reason: HOSPADM

## 2025-01-23 RX ORDER — OXYCODONE HYDROCHLORIDE 5 MG/1
10 TABLET ORAL EVERY 4 HOURS PRN
Status: DISCONTINUED | OUTPATIENT
Start: 2025-01-23 | End: 2025-01-24 | Stop reason: HOSPADM

## 2025-01-23 RX ORDER — BISACODYL 5 MG
10 TABLET, DELAYED RELEASE (ENTERIC COATED) ORAL DAILY PRN
Status: DISCONTINUED | OUTPATIENT
Start: 2025-01-23 | End: 2025-01-24 | Stop reason: HOSPADM

## 2025-01-23 RX ORDER — ACETAMINOPHEN 325 MG/1
975 TABLET ORAL ONCE
Status: COMPLETED | OUTPATIENT
Start: 2025-01-23 | End: 2025-01-23

## 2025-01-23 RX ORDER — KETOROLAC TROMETHAMINE 30 MG/ML
15 INJECTION, SOLUTION INTRAMUSCULAR; INTRAVENOUS EVERY 6 HOURS SCHEDULED
Status: DISCONTINUED | OUTPATIENT
Start: 2025-01-23 | End: 2025-01-24 | Stop reason: HOSPADM

## 2025-01-23 RX ADMIN — CALCIUM CARBONATE (ANTACID) CHEW TAB 500 MG 1000 MG: 500 CHEW TAB at 21:34

## 2025-01-23 RX ADMIN — OXYCODONE 10 MG: 5 TABLET ORAL at 21:34

## 2025-01-23 RX ADMIN — MIDAZOLAM 2 MG: 1 INJECTION INTRAMUSCULAR; INTRAVENOUS at 07:40

## 2025-01-23 RX ADMIN — OXYCODONE 5 MG: 5 TABLET ORAL at 12:49

## 2025-01-23 RX ADMIN — TRANEXAMIC ACID 2000 MG: 100 INJECTION, SOLUTION INTRAVENOUS at 07:58

## 2025-01-23 RX ADMIN — SODIUM CHLORIDE, SODIUM LACTATE, POTASSIUM CHLORIDE, AND CALCIUM CHLORIDE 100 ML/HR: 600; 310; 30; 20 INJECTION, SOLUTION INTRAVENOUS at 12:52

## 2025-01-23 RX ADMIN — FENTANYL CITRATE 50 MCG: 50 INJECTION, SOLUTION INTRAMUSCULAR; INTRAVENOUS at 07:48

## 2025-01-23 RX ADMIN — ROCURONIUM BROMIDE 50 MG: 10 INJECTION, SOLUTION INTRAVENOUS at 07:48

## 2025-01-23 RX ADMIN — FENTANYL CITRATE 50 MCG: 50 INJECTION INTRAMUSCULAR; INTRAVENOUS at 09:54

## 2025-01-23 RX ADMIN — CEFAZOLIN SODIUM 2 G: 2 INJECTION, SOLUTION INTRAVENOUS at 23:36

## 2025-01-23 RX ADMIN — FENTANYL CITRATE 50 MCG: 50 INJECTION INTRAMUSCULAR; INTRAVENOUS at 09:28

## 2025-01-23 RX ADMIN — POVIDONE-IODINE 1 APPLICATION: 5 SOLUTION TOPICAL at 06:48

## 2025-01-23 RX ADMIN — PROPOFOL 100 MG: 10 INJECTION, EMULSION INTRAVENOUS at 09:05

## 2025-01-23 RX ADMIN — OXYCODONE 10 MG: 5 TABLET ORAL at 17:21

## 2025-01-23 RX ADMIN — KETOROLAC TROMETHAMINE 15 MG: 30 INJECTION, SOLUTION INTRAMUSCULAR at 23:39

## 2025-01-23 RX ADMIN — SODIUM CHLORIDE, POTASSIUM CHLORIDE, SODIUM LACTATE AND CALCIUM CHLORIDE: 600; 310; 30; 20 INJECTION, SOLUTION INTRAVENOUS at 07:38

## 2025-01-23 RX ADMIN — HYDROMORPHONE HYDROCHLORIDE 0.2 MG: 0.2 INJECTION, SOLUTION INTRAMUSCULAR; INTRAVENOUS; SUBCUTANEOUS at 11:07

## 2025-01-23 RX ADMIN — PROPOFOL 200 MG: 10 INJECTION, EMULSION INTRAVENOUS at 07:48

## 2025-01-23 RX ADMIN — DEXAMETHASONE SODIUM PHOSPHATE 10 MG: 4 INJECTION, SOLUTION INTRAMUSCULAR; INTRAVENOUS at 08:00

## 2025-01-23 RX ADMIN — ACETAMINOPHEN 975 MG: 325 TABLET ORAL at 20:56

## 2025-01-23 RX ADMIN — ACETAMINOPHEN 975 MG: 325 TABLET ORAL at 12:49

## 2025-01-23 RX ADMIN — CEFAZOLIN SODIUM 2 G: 2 INJECTION, SOLUTION INTRAVENOUS at 16:18

## 2025-01-23 RX ADMIN — FENTANYL CITRATE 50 MCG: 50 INJECTION, SOLUTION INTRAMUSCULAR; INTRAVENOUS at 08:06

## 2025-01-23 RX ADMIN — HYDROMORPHONE HYDROCHLORIDE 0.2 MG: 0.2 INJECTION, SOLUTION INTRAMUSCULAR; INTRAVENOUS; SUBCUTANEOUS at 10:20

## 2025-01-23 RX ADMIN — ACETAMINOPHEN 975 MG: 325 TABLET ORAL at 06:47

## 2025-01-23 RX ADMIN — HYDROMORPHONE HYDROCHLORIDE 0.2 MG: 0.2 INJECTION, SOLUTION INTRAMUSCULAR; INTRAVENOUS; SUBCUTANEOUS at 10:56

## 2025-01-23 RX ADMIN — FENTANYL CITRATE 50 MCG: 50 INJECTION INTRAMUSCULAR; INTRAVENOUS at 09:40

## 2025-01-23 RX ADMIN — GABAPENTIN 600 MG: 600 TABLET, FILM COATED ORAL at 06:47

## 2025-01-23 RX ADMIN — HYDROMORPHONE HYDROCHLORIDE 0.2 MG: 0.2 INJECTION, SOLUTION INTRAMUSCULAR; INTRAVENOUS; SUBCUTANEOUS at 10:39

## 2025-01-23 RX ADMIN — ONDANSETRON HYDROCHLORIDE 4 MG: 2 INJECTION INTRAMUSCULAR; INTRAVENOUS at 08:54

## 2025-01-23 RX ADMIN — METHOCARBAMOL 1000 MG: 500 TABLET ORAL at 14:16

## 2025-01-23 RX ADMIN — DEXAMETHASONE SODIUM PHOSPHATE 8 MG: 4 INJECTION, SOLUTION INTRAMUSCULAR; INTRAVENOUS at 16:19

## 2025-01-23 RX ADMIN — SUGAMMADEX 200 MG: 100 INJECTION, SOLUTION INTRAVENOUS at 09:01

## 2025-01-23 RX ADMIN — FENTANYL CITRATE 50 MCG: 50 INJECTION, SOLUTION INTRAMUSCULAR; INTRAVENOUS at 08:01

## 2025-01-23 RX ADMIN — METHOCARBAMOL 1000 MG: 500 TABLET ORAL at 20:56

## 2025-01-23 RX ADMIN — METHOCARBAMOL 1000 MG: 1000 INJECTION, SOLUTION INTRAMUSCULAR; INTRAVENOUS at 08:56

## 2025-01-23 RX ADMIN — LIDOCAINE HYDROCHLORIDE 50 MG: 10 INJECTION, SOLUTION INFILTRATION; PERINEURAL at 07:48

## 2025-01-23 RX ADMIN — FENTANYL CITRATE 50 MCG: 50 INJECTION, SOLUTION INTRAMUSCULAR; INTRAVENOUS at 09:18

## 2025-01-23 RX ADMIN — HYDROMORPHONE HYDROCHLORIDE 0.2 MG: 0.2 INJECTION, SOLUTION INTRAMUSCULAR; INTRAVENOUS; SUBCUTANEOUS at 10:46

## 2025-01-23 RX ADMIN — KETOROLAC TROMETHAMINE 30 MG: 30 INJECTION, SOLUTION INTRAMUSCULAR at 08:55

## 2025-01-23 RX ADMIN — POLYETHYLENE GLYCOL 3350 17 G: 17 POWDER, FOR SOLUTION ORAL at 12:49

## 2025-01-23 RX ADMIN — FENTANYL CITRATE 50 MCG: 50 INJECTION INTRAMUSCULAR; INTRAVENOUS at 09:34

## 2025-01-23 RX ADMIN — KETOROLAC TROMETHAMINE 15 MG: 30 INJECTION, SOLUTION INTRAMUSCULAR at 14:16

## 2025-01-23 RX ADMIN — HYDROMORPHONE HYDROCHLORIDE 0.2 MG: 0.2 INJECTION, SOLUTION INTRAMUSCULAR; INTRAVENOUS; SUBCUTANEOUS at 10:01

## 2025-01-23 RX ADMIN — ROCURONIUM BROMIDE 20 MG: 10 INJECTION, SOLUTION INTRAVENOUS at 08:06

## 2025-01-23 RX ADMIN — CEFAZOLIN SODIUM 2 G: 2 INJECTION, SOLUTION INTRAVENOUS at 07:40

## 2025-01-23 RX ADMIN — HYDROMORPHONE HYDROCHLORIDE 0.2 MG: 0.2 INJECTION, SOLUTION INTRAMUSCULAR; INTRAVENOUS; SUBCUTANEOUS at 10:33

## 2025-01-23 RX ADMIN — DEXAMETHASONE SODIUM PHOSPHATE 8 MG: 4 INJECTION, SOLUTION INTRAMUSCULAR; INTRAVENOUS at 21:01

## 2025-01-23 RX ADMIN — MEPERIDINE HYDROCHLORIDE 12.5 MG: 25 INJECTION INTRAMUSCULAR; INTRAVENOUS; SUBCUTANEOUS at 09:28

## 2025-01-23 RX ADMIN — MIDAZOLAM HYDROCHLORIDE 1 MG: 1 INJECTION, SOLUTION INTRAMUSCULAR; INTRAVENOUS at 09:46

## 2025-01-23 RX ADMIN — SODIUM CHLORIDE, SODIUM LACTATE, POTASSIUM CHLORIDE, AND CALCIUM CHLORIDE 100 ML/HR: 600; 310; 30; 20 INJECTION, SOLUTION INTRAVENOUS at 17:21

## 2025-01-23 RX ADMIN — HYDROMORPHONE HYDROCHLORIDE 0.2 MG: 0.2 INJECTION, SOLUTION INTRAMUSCULAR; INTRAVENOUS; SUBCUTANEOUS at 10:11

## 2025-01-23 RX ADMIN — LISINOPRIL 10 MG: 10 TABLET ORAL at 20:56

## 2025-01-23 SDOH — SOCIAL STABILITY: SOCIAL INSECURITY: DO YOU FEEL UNSAFE GOING BACK TO THE PLACE WHERE YOU ARE LIVING?: NO

## 2025-01-23 SDOH — SOCIAL STABILITY: SOCIAL INSECURITY: ARE YOU OR HAVE YOU BEEN THREATENED OR ABUSED PHYSICALLY, EMOTIONALLY, OR SEXUALLY BY ANYONE?: NO

## 2025-01-23 SDOH — SOCIAL STABILITY: SOCIAL INSECURITY
WITHIN THE LAST YEAR, HAVE YOU BEEN RAPED OR FORCED TO HAVE ANY KIND OF SEXUAL ACTIVITY BY YOUR PARTNER OR EX-PARTNER?: NO

## 2025-01-23 SDOH — SOCIAL STABILITY: SOCIAL INSECURITY: ABUSE: ADULT

## 2025-01-23 SDOH — SOCIAL STABILITY: SOCIAL INSECURITY: ARE THERE ANY APPARENT SIGNS OF INJURIES/BEHAVIORS THAT COULD BE RELATED TO ABUSE/NEGLECT?: NO

## 2025-01-23 SDOH — SOCIAL STABILITY: SOCIAL INSECURITY: WITHIN THE LAST YEAR, HAVE YOU BEEN AFRAID OF YOUR PARTNER OR EX-PARTNER?: NO

## 2025-01-23 SDOH — SOCIAL STABILITY: SOCIAL INSECURITY: WERE YOU ABLE TO COMPLETE ALL THE BEHAVIORAL HEALTH SCREENINGS?: YES

## 2025-01-23 SDOH — ECONOMIC STABILITY: FOOD INSECURITY: WITHIN THE PAST 12 MONTHS, THE FOOD YOU BOUGHT JUST DIDN'T LAST AND YOU DIDN'T HAVE MONEY TO GET MORE.: NEVER TRUE

## 2025-01-23 SDOH — SOCIAL STABILITY: SOCIAL INSECURITY: WITHIN THE LAST YEAR, HAVE YOU BEEN HUMILIATED OR EMOTIONALLY ABUSED IN OTHER WAYS BY YOUR PARTNER OR EX-PARTNER?: NO

## 2025-01-23 SDOH — SOCIAL STABILITY: SOCIAL INSECURITY
WITHIN THE LAST YEAR, HAVE YOU BEEN KICKED, HIT, SLAPPED, OR OTHERWISE PHYSICALLY HURT BY YOUR PARTNER OR EX-PARTNER?: NO

## 2025-01-23 SDOH — ECONOMIC STABILITY: FOOD INSECURITY: WITHIN THE PAST 12 MONTHS, YOU WORRIED THAT YOUR FOOD WOULD RUN OUT BEFORE YOU GOT THE MONEY TO BUY MORE.: NEVER TRUE

## 2025-01-23 SDOH — ECONOMIC STABILITY: INCOME INSECURITY: IN THE PAST 12 MONTHS HAS THE ELECTRIC, GAS, OIL, OR WATER COMPANY THREATENED TO SHUT OFF SERVICES IN YOUR HOME?: NO

## 2025-01-23 SDOH — SOCIAL STABILITY: SOCIAL INSECURITY: HAVE YOU HAD THOUGHTS OF HARMING ANYONE ELSE?: NO

## 2025-01-23 SDOH — SOCIAL STABILITY: SOCIAL INSECURITY: DOES ANYONE TRY TO KEEP YOU FROM HAVING/CONTACTING OTHER FRIENDS OR DOING THINGS OUTSIDE YOUR HOME?: NO

## 2025-01-23 SDOH — SOCIAL STABILITY: SOCIAL INSECURITY: HAS ANYONE EVER THREATENED TO HURT YOUR FAMILY OR YOUR PETS?: NO

## 2025-01-23 SDOH — SOCIAL STABILITY: SOCIAL INSECURITY: DO YOU FEEL ANYONE HAS EXPLOITED OR TAKEN ADVANTAGE OF YOU FINANCIALLY OR OF YOUR PERSONAL PROPERTY?: NO

## 2025-01-23 ASSESSMENT — PAIN DESCRIPTION - LOCATION
LOCATION: BACK

## 2025-01-23 ASSESSMENT — COGNITIVE AND FUNCTIONAL STATUS - GENERAL
TURNING FROM BACK TO SIDE WHILE IN FLAT BAD: A LITTLE
TOILETING: A LITTLE
WALKING IN HOSPITAL ROOM: A LITTLE
MOBILITY SCORE: 19
DAILY ACTIVITIY SCORE: 20
TURNING FROM BACK TO SIDE WHILE IN FLAT BAD: A LITTLE
STANDING UP FROM CHAIR USING ARMS: A LITTLE
DRESSING REGULAR LOWER BODY CLOTHING: A LOT
MOBILITY SCORE: 15
MOVING FROM LYING ON BACK TO SITTING ON SIDE OF FLAT BED WITH BEDRAILS: A LITTLE
TOILETING: A LOT
CLIMB 3 TO 5 STEPS WITH RAILING: A LOT
STANDING UP FROM CHAIR USING ARMS: A LITTLE
PATIENT BASELINE BEDBOUND: NO
DRESSING REGULAR UPPER BODY CLOTHING: A LITTLE
DRESSING REGULAR UPPER BODY CLOTHING: A LITTLE
EATING MEALS: A LITTLE
DAILY ACTIVITIY SCORE: 16
HELP NEEDED FOR BATHING: A LITTLE
MOVING TO AND FROM BED TO CHAIR: A LITTLE
HELP NEEDED FOR BATHING: A LITTLE
MOVING FROM LYING ON BACK TO SITTING ON SIDE OF FLAT BED WITH BEDRAILS: A LITTLE
MOVING TO AND FROM BED TO CHAIR: A LOT
PERSONAL GROOMING: A LITTLE
DRESSING REGULAR LOWER BODY CLOTHING: A LITTLE
WALKING IN HOSPITAL ROOM: A LOT

## 2025-01-23 ASSESSMENT — PATIENT HEALTH QUESTIONNAIRE - PHQ9
SUM OF ALL RESPONSES TO PHQ9 QUESTIONS 1 & 2: 0
2. FEELING DOWN, DEPRESSED OR HOPELESS: NOT AT ALL
1. LITTLE INTEREST OR PLEASURE IN DOING THINGS: NOT AT ALL

## 2025-01-23 ASSESSMENT — PAIN DESCRIPTION - ORIENTATION
ORIENTATION: MID;LOWER
ORIENTATION: LOWER;MID
ORIENTATION: LOWER
ORIENTATION: LOWER;MID
ORIENTATION: LOWER
ORIENTATION: LOWER;MID
ORIENTATION: LOWER

## 2025-01-23 ASSESSMENT — ACTIVITIES OF DAILY LIVING (ADL)
PATIENT'S MEMORY ADEQUATE TO SAFELY COMPLETE DAILY ACTIVITIES?: YES
GROOMING: INDEPENDENT
TOILETING: INDEPENDENT
WALKS IN HOME: INDEPENDENT
LACK_OF_TRANSPORTATION: NO
ADL_ASSISTANCE: INDEPENDENT
BATHING_ASSISTANCE: MINIMAL
JUDGMENT_ADEQUATE_SAFELY_COMPLETE_DAILY_ACTIVITIES: YES
FEEDING YOURSELF: INDEPENDENT
BATHING: INDEPENDENT
ADEQUATE_TO_COMPLETE_ADL: YES
ADL_ASSISTANCE: INDEPENDENT
HEARING - LEFT EAR: FUNCTIONAL
HEARING - RIGHT EAR: FUNCTIONAL
DRESSING YOURSELF: INDEPENDENT

## 2025-01-23 ASSESSMENT — PAIN - FUNCTIONAL ASSESSMENT
PAIN_FUNCTIONAL_ASSESSMENT: 0-10
PAIN_FUNCTIONAL_ASSESSMENT: UNABLE TO SELF-REPORT
PAIN_FUNCTIONAL_ASSESSMENT: 0-10

## 2025-01-23 ASSESSMENT — LIFESTYLE VARIABLES
HOW OFTEN DO YOU HAVE A DRINK CONTAINING ALCOHOL: 2-3 TIMES A WEEK
AUDIT-C TOTAL SCORE: 3
AUDIT-C TOTAL SCORE: 3
SKIP TO QUESTIONS 9-10: 1
HOW OFTEN DO YOU HAVE 6 OR MORE DRINKS ON ONE OCCASION: NEVER
HOW MANY STANDARD DRINKS CONTAINING ALCOHOL DO YOU HAVE ON A TYPICAL DAY: 1 OR 2

## 2025-01-23 ASSESSMENT — PAIN SCALES - GENERAL
PAINLEVEL_OUTOF10: 5 - MODERATE PAIN
PAINLEVEL_OUTOF10: 6
PAINLEVEL_OUTOF10: 5 - MODERATE PAIN
PAINLEVEL_OUTOF10: 5 - MODERATE PAIN
PAINLEVEL_OUTOF10: 10 - WORST POSSIBLE PAIN
PAINLEVEL_OUTOF10: 5 - MODERATE PAIN
PAINLEVEL_OUTOF10: 6
PAINLEVEL_OUTOF10: 10 - WORST POSSIBLE PAIN
PAINLEVEL_OUTOF10: 9
PAINLEVEL_OUTOF10: 4
PAINLEVEL_OUTOF10: 0 - NO PAIN
PAINLEVEL_OUTOF10: 5 - MODERATE PAIN
PAINLEVEL_OUTOF10: 5 - MODERATE PAIN
PAINLEVEL_OUTOF10: 7
PAINLEVEL_OUTOF10: 7
PAINLEVEL_OUTOF10: 10 - WORST POSSIBLE PAIN
PAINLEVEL_OUTOF10: 4
PAINLEVEL_OUTOF10: 5 - MODERATE PAIN
PAINLEVEL_OUTOF10: 2
PAINLEVEL_OUTOF10: 10 - WORST POSSIBLE PAIN
PAINLEVEL_OUTOF10: 10 - WORST POSSIBLE PAIN

## 2025-01-23 ASSESSMENT — COLUMBIA-SUICIDE SEVERITY RATING SCALE - C-SSRS
2. HAVE YOU ACTUALLY HAD ANY THOUGHTS OF KILLING YOURSELF?: NO
6. HAVE YOU EVER DONE ANYTHING, STARTED TO DO ANYTHING, OR PREPARED TO DO ANYTHING TO END YOUR LIFE?: NO
1. IN THE PAST MONTH, HAVE YOU WISHED YOU WERE DEAD OR WISHED YOU COULD GO TO SLEEP AND NOT WAKE UP?: NO

## 2025-01-23 ASSESSMENT — PAIN DESCRIPTION - DESCRIPTORS: DESCRIPTORS: SPASM

## 2025-01-23 NOTE — PROGRESS NOTES
Physical Therapy    Physical Therapy Evaluation    Patient Name: Jacob Boone  MRN: [id number]  Department: [address]  Room: [address] [place] [address]  Today's Date: 1/23/2025   Time Calculation  Start Time: 1402  Stop Time: 1422  Time Calculation (min): 20 min    Assessment/Plan   PT Assessment  PT Assessment Results: Decreased strength, Decreased endurance, Impaired balance, Decreased mobility, Decreased coordination, Decreased skin integrity, Orthopedic restrictions, Pain  Rehab Prognosis: Good  Barriers to Discharge Home: No anticipated barriers  Evaluation/Treatment Tolerance: Patient limited by pain, Other (Comment) (limited by spasms)  Medical Staff Made Aware: Yes  Strengths: Premorbid level of function, Attitude of self  Barriers to Participation: Comorbidities  End of Session Communication: Bedside nurse  Assessment Comment: Pt w/ high pain level and LE spasms on eval, improved by end of session; limited activity tolerated, min assist of 2. Pt would benefit from continued PT services to progress safe functional mobility post op.  End of Session Patient Position: Bed, 3 rail up, Alarm on  IP OR SWING BED PT PLAN  Inpatient or Swing Bed: Inpatient  PT Plan  Treatment/Interventions: Transfer training, Bed mobility, Gait training, Stair training, Balance training, Strengthening, Endurance training, Therapeutic exercise, Therapeutic activity, Home exercise program  PT Plan: Ongoing PT  PT Frequency: BID  PT Discharge Recommendations: Low intensity level of continued care  Equipment Recommended upon Discharge: Wheeled walker  PT Recommended Transfer Status: Assist x2, Assistive device  PT - OK to Discharge: Yes    Subjective   General Visit Information:  General  Reason for Referral: recent surgery; s/p Lumbar 4-5 Minimally Invasive Decompression and Interbody Fusion on 1/23/25  Referred By: Dr. Stratton  Past Medical History Relevant to Rehab: afib, LBP, neck pain, vertigo, heart murmur, sepsis, HTN, sleep apnea,  "cardioversion  Family/Caregiver Present: No  Co-Treatment: OT  Co-Treatment Reason: pt safety and tolerance on POD 0 w/ elevated pain level and spasms  Prior to Session Communication: Bedside nurse  Patient Position Received: Bed, 3 rail up, Alarm on  Preferred Learning Style: visual, verbal  General Comment: Pt is a 64 y.o. male adm for elective L-spine surgery due to chronic LBP radiating down BLEs w/ LE spasms. Pt reports occasional knee buckling and \"near\" falls. Pt reports LEs just started to spasm, but agreeable to PT eval.  Home Living:  Home Living  Type of Home: House  Lives With: Spouse  Home Adaptive Equipment: Walker rolling or standard  Home Layout: One level  Home Access: Stairs to enter with rails  Entrance Stairs-Rails:  (one side)  Entrance Stairs-Number of Steps: 3  Bathroom Shower/Tub: Walk-in shower  Bathroom Toilet: Handicapped height  Bathroom Equipment: Shower chair with back  Bathroom Accessibility: main level  Home Living Comments: spouse works  Prior Level of Function:  Prior Function Per Pt/Caregiver Report  Level of [place]: Independent with ADLs and functional transfers, Independent with homemaking with ambulation  Receives Help From: Family  ADL Assistance: Independent  Homemaking Assistance: Independent (shares w/ spouse)  Ambulatory Assistance: Independent  Vocational: Retired  Leisure: wood working  Hand Dominance: Left  Prior Function Comments: denies falls but states reports occasionally his pain/spasms would \"cause him to go to his knees.\"  + drives  Precautions:  Precautions  Hearing/Visual Limitations: glasses  Medical Precautions: Fall precautions, Oxygen therapy device and L/min, Spinal precautions (2L)  Post-Surgical Precautions: Spinal precautions  Precautions Comment: Reviewed post op spine precautions, logroll technique        Objective   Pain:  Pain Assessment  Pain Assessment: 0-10  0-10 (Numeric) Pain Score: 10 - Worst possible pain  Pain Type: Acute pain, Surgical " pain  Pain Location: Back  Pain Orientation: Lower  Pain Descriptors: Spasm  Pain Onset: Sudden  Clinical Progression: Gradually improving (at end of session)  Pain Interventions: Repositioned  Response to Interventions: No change in pain, Other (Comment) (reports spasms resolving once back in bed)  Cognition:  Cognition  Overall Cognitive Status: Within Functional Limits  Orientation Level: Oriented X4  Cognition Comments: alert, pleasant and cooperative, follows commands    General Assessments:  Activity Tolerance  Endurance: Decreased tolerance for upright activites  Activity Tolerance Comments: fair-/Poor+ (due to high pain level w/ spasms)    Sensation  Sensation Comment: denied abn sensation    Strength  Strength Comments: grossly 3/5 or > through observation of function  Coordination  Coordination Comment: pt tremulous w/ LE spasms    Postural Control  Posture Comment: guarded due to spasms    Dynamic Standing Balance  Dynamic Standing-Balance Support: Bilateral upper extremity supported (RW)  Dynamic Standing-Level of Assistance: Minimum assistance (+2 for safety due to spasms)  Dynamic Standing-Balance:  (sidesteps at EOB)  Dynamic Standing-Comments: Fair-/Poor+  Functional Assessments:  Bed Mobility 1  Bed Mobility 1: Supine to sitting  Level of Assistance 1: Minimum assistance  Bed Mobility Comments 1: to Rt EOB, cues for logroll, assist for trunk up  Bed Mobility 2  Bed Mobility  2: Sitting to supine  Level of Assistance 2: Minimum assistance  Bed Mobility Comments 2: cues for logroll technique, assist for LEs onto bed    Transfer 1  Technique 1: Sit to stand, Stand to sit  Transfer Device 1: Walker  Transfer Level of Assistance 1: Minimum assistance, +2  Trials/Comments 1: from/to EOB, cues for safe hand placement. Slow to transfer, assist to get steady on feet due to spasms    Ambulation/Gait Training  Ambulation/Gait Training Performed: Yes  Ambulation/Gait Training 1  Surface 1: Level tile  Device 1:  Rolling walker  Assistance 1: Minimum assistance (+2)  Comments/Distance (ft) 1: Pt able to take a couple of steps Rt to HOB, assist for walker management. Pt shaky but no LOB.  Extremity/Trunk Assessments:  RLE   RLE :  (grossly functional, though a little weak, shaky)  LLE   LLE :  (grossly functional, though a little weak, shaky)  Outcome Measures:  [facility] Basic Mobility  Turning from your back to your side while in a flat bed without using bedrails: A little  Moving from lying on your back to sitting on the side of a flat bed without using bedrails: A little  Moving to and from bed to chair (including a wheelchair): A lot  Standing up from a chair using your arms (e.g. wheelchair or bedside chair): A little  To walk in hospital room: A lot  Climbing 3-5 steps with railing: A lot  Basic Mobility - Total Score: 15    Encounter Problems       Encounter Problems (Active)       Mobility       STG - Patient will ambulate 100' w/ RW and CGA, no buckling (Progressing)       Start:  01/23/25    Expected End:  01/24/25            STG - Patient will ascend and descend four to six stairs w/ 1 rail and min assist (Progressing)       Start:  01/23/25    Expected End:  01/24/25               PT Transfers       STG - Patient to transfer to and from sit to supine IND via logroll (Progressing)       Start:  01/23/25    Expected End:  01/24/25            STG - Patient will transfer sit to and from stand MOD I w/ safe techniqeuue (Progressing)       Start:  01/23/25    Expected End:  01/24/25               Safety       LTG - Patient will adhere to spine precautions during functional mobility (Progressing)       Start:  01/23/25    Expected End:  01/24/25                   Education Documentation  Precautions, taught by Kiki Boone, PT at 1/23/2025  3:44 PM.  Learner: Patient  Readiness: Acceptance  Method: Explanation, Demonstration  Response: Verbalizes Understanding, Needs Reinforcement  Comment: safety and  technique    Mobility Training, taught by Kiki Boone, PT at 1/23/2025  3:44 PM.  Learner: Patient  Readiness: Acceptance  Method: Explanation, Demonstration  Response: Verbalizes Understanding, Needs Reinforcement  Comment: safety and technique    Education Comments  No comments found.

## 2025-01-23 NOTE — ANESTHESIA POSTPROCEDURE EVALUATION
Patient: Jacob Boone    Procedure Summary       Date: 01/23/25 Room / Location: [facility] 02 / [facility]    Anesthesia Start: 0738 Anesthesia Stop: 0925    Procedure: Lumbar 4-5 Minimally Invasive Decompression and Interbody Fusion (Spine Lumbar) Diagnosis:       Neurogenic claudication due to lumbar spinal stenosis      Lumbar radiculopathy      (Neurogenic claudication due to lumbar spinal stenosis [M48.062])      (Lumbar radiculopathy [M54.16])    Surgeons: Fredy Stratton MD Responsible Provider: Pasha Tomas DO    Anesthesia Type: general ASA Status: 2            Anesthesia Type: general    Vitals Value Taken Time   /81 01/23/25 0931   Temp  01/23/25 0932   Pulse 75 01/23/25 0931   Resp 15 01/23/25 0931   SpO2 97 % 01/23/25 0931   Vitals shown include unfiled device data.    Anesthesia Post Evaluation    Patient location during evaluation: bedside  Patient participation: complete - patient participated  Level of consciousness: awake  Pain management: adequate  Airway patency: patent  Cardiovascular status: acceptable  Respiratory status: acceptable  Hydration status: acceptable  Postoperative Nausea and Vomiting: none        There were no known notable events for this encounter.

## 2025-01-23 NOTE — PROGRESS NOTES
Occupational Therapy    Evaluation    Patient Name: Jacob Boone  MRN: [id number]  Department: [address]  Room: [address] [place] [address]  Today's Date: 1/23/2025  Time Calculation  Start Time: 1401  Stop Time: 1419  Time Calculation (min): 18 min    Assessment  IP OT Assessment  OT Assessment: 63 y/o male s/p L4-5 minimally invasive decompression and interbody fusion. On eval he presents below baseline requiring increased assist for xfers, mobility, and self care d/t decreased strength, balance, activity tolerance and post op pain. Skilled OT services are required to maximize safety/IND prior to DC  Prognosis: Excellent  Barriers to Discharge Home: No anticipated barriers  Evaluation/Treatment Tolerance: Patient limited by pain  Medical Staff Made Aware: Yes  End of Session Communication: Bedside nurse  End of Session Patient Position: Bed, 3 rail up, Alarm on  Plan:  Treatment Interventions: ADL retraining, Functional transfer training, UE strengthening/ROM, Endurance training, Patient/family training, Neuromuscular reeducation, Equipment evaluation/education, Compensatory technique education  OT Frequency: 4 times per week  OT Discharge Recommendations: Low intensity level of continued care  Equipment Recommended upon Discharge: Wheeled walker  OT Recommended Transfer Status: Assist of 1  OT - OK to Discharge: Yes    Subjective   Current Problem:  1. Neurogenic claudication due to lumbar spinal stenosis        2. Spinal stenosis, lumbar region, with neurogenic claudication  acetaminophen (Tylenol Extra Strength) 500 mg tablet    polyethylene glycol (Miralax) 17 gram/dose powder    oxyCODONE (Roxicodone) 5 mg immediate release tablet    methocarbamol (Robaxin) 500 mg tablet      3. Lumbar radiculopathy          General:  General  Reason for Referral: Decreased ADLs, s/p L4-5 minimally invasive decompression and interbody fusion  Referred By: Dr. Stratton  Past Medical History Relevant to Rehab: afib, LBP, neck pain, vertigo, heart  murmur, sepsis, HTN, sleep apnea  Family/Caregiver Present: No  Co-Treatment: PT  Co-Treatment Reason: d/t increased need for skilled intervention  Prior to Session Communication: Bedside nurse  Patient Position Received: Bed, 3 rail up, Alarm on  Preferred Learning Style: visual, verbal  General Comment: Cleared by nsg, pt met in supine, agreeable to OT session.  Limited by high pain  Precautions:  Hearing/Visual Limitations: glasses  Medical Precautions: Fall precautions, Spinal precautions, Oxygen therapy device and L/min  Post-Surgical Precautions: Spinal precautions  Precautions Comment: on 2L O2 via NC    Pain:  Pain Assessment  Pain Assessment: 0-10  0-10 (Numeric) Pain Score: 10 - Worst possible pain  Pain Type: Acute pain, Surgical pain  Pain Location: Back  Pain Orientation: Lower  Pain Interventions: Repositioned  Response to Interventions: Content/relaxed, Provider notified (nsg in at EOS to provide pain medication; pain improved from 10/10 to 4/10)    Objective   Cognition:  Overall Cognitive Status: Within Functional Limits  Orientation Level: Oriented X4  Cognition Comments: follows all commands appropriately within limits of pain at start of session    Home Living:  Type of Home: House  Lives With: Spouse  Home Adaptive Equipment: Walker rolling or standard  Home Layout: One level  Home Access: Stairs to enter with rails  Entrance Stairs-Rails:  (unilateral)  Entrance Stairs-Number of Steps: 3 LISSY  Bathroom Shower/Tub: Walk-in shower  Bathroom Toilet: Handicapped height  Bathroom Equipment: Shower chair with back  Bathroom Accessibility: main level     Prior Function:  Level of [place]: Independent with ADLs and functional transfers, Independent with homemaking with ambulation  Receives Help From: Family  ADL Assistance: Independent  Homemaking Assistance: Independent (shar with spouse, endorses certain IADLS were difficult)  Ambulatory Assistance: Independent  Leisure: wood working  Hand  "Dominance: Left  Prior Function Comments: denies falls but states reports occasionally his pain/spasms would \"cause him to go to his knees.\"  + drives    ADL:  Eating Assistance: Stand by  Eating Deficit: Setup  Grooming Assistance: Stand by  Grooming Deficit: Setup  Bathing Assistance: Minimal  Bathing Deficit:  (anticipated)  UE Dressing Assistance: Minimal  UE Dressing Deficit:  (gown mgmt)  LE Dressing Assistance: Moderate  LE Dressing Deficit: Don/doff L sock, Don/doff R sock  Toileting Assistance with Device: Moderate  Toileting Deficit: Use of bedpan/urinal setup (unable to void in sitting or standing)  ADL Comments: all per clinical judgement d/t pain levels    Activity Tolerance:  Endurance: Decreased tolerance for upright activites  Activity Tolerance Comments: fair- d/t profound post op pain    Bed Mobility/Transfers:   Bed Mobility  Bed Mobility: Yes  Bed Mobility 1  Bed Mobility 1: Supine to sitting  Level of Assistance 1: Minimum assistance  Bed Mobility Comments 1: min A for trunk up with step by step cues on log roll  Bed Mobility 2  Bed Mobility  2: Sitting to supine  Level of Assistance 2: Minimum assistance  Bed Mobility Comments 2: min A to elevate LEs via log roll. cues on accuracy  Bed Mobility 3  Bed Mobility 3: Scooting  Level of Assistance 3: Dependent  Bed Mobility Comments 3: assist x2 to scoot towards HOB in supine    Transfers  Transfer: Yes  Transfer 1  Technique 1: Stand to sit, Sit to stand  Transfer Device 1: Walker  Transfer Level of Assistance 1: Minimum assistance, +2  Trials/Comments 1: STS from EOB with MIN A x2 and cues on hand placement, LE positioning, safety techniques.  challenged to complete ~3-4 lateral steps towards HOB with MIN A x2 for stability and cues on walker mgmt, step sequencing, safety.  No overt instability although c/o RT LE pain/discomfort    Functional Mobility:  Functional Mobility  Functional Mobility Performed: No    Sitting Balance:  Static Sitting " Balance  Static Sitting-Balance Support: Feet supported, Bilateral upper extremity supported  Static Sitting-Level of Assistance: Close supervision  Static Sitting-Comment/Number of Minutes: EOB sitting    Vision: Vision - Basic Assessment  Current Vision: Wears glasses only for reading  Sensation:  Light Touch: Partial deficits in the RLE, Partial deficits in the LLE (mild)  Strength:  Strength Comments: BUES at least >/= 3/5, observed functionally  Coordination:  Movements are Fluid and Coordinated: No  Upper Body Coordination: somewhat tremulous d/t pain  Lower Body Coordination: impaired d/t pain and mild weakness   Hand Function:  Hand Function  Gross Grasp: Functional  Coordination: Functional  Extremities: RUE   RUE : Within Functional Limits and LUE   LUE: Within Functional Limits    Outcome Measures: [facility] Daily Activity  Putting on and taking off regular lower body clothing: A lot  Bathing (including washing, rinsing, drying): A little  Putting on and taking off regular upper body clothing: A little  Toileting, which includes using toilet, bedpan or urinal: A lot  Taking care of personal grooming such as brushing teeth: A little  Eating Meals: A little  Daily Activity - Total Score: 16      Education Documentation  Body Mechanics, taught by Hernando Mahmood OT at 1/23/2025  2:42 PM.  Learner: Patient  Readiness: Acceptance  Method: Explanation  Response: Needs Reinforcement  Comment: educated on post op precautions, fall precautions, safety techniques    Precautions, taught by Hernando Mahmood OT at 1/23/2025  2:42 PM.  Learner: Patient  Readiness: Acceptance  Method: Explanation  Response: Needs Reinforcement  Comment: educated on post op precautions, fall precautions, safety techniques    Education Comments  No comments found.      Goals:   Encounter Problems       Encounter Problems (Active)       OT Goals       ADLS (Progressing)       Start:  01/23/25    Expected End:  01/30/25       Patient will complete  ADL tasks, with set-up using AE need in order to increase patient's safety and independence with self-care tasks.           Functional Transfers (Progressing)       Start:  01/23/25    Expected End:  01/30/25       Patient will complete functional transfers with set-up using least restrictive device, in order to increase patient's safety and independence with daily tasks.           Precautions (Progressing)       Start:  01/23/25    Expected End:  01/30/25       Patient will independently verbalize 3/3 post op precautions in preparation for ADLS.

## 2025-01-23 NOTE — ANESTHESIA PROCEDURE NOTES
Airway  Date/Time: 1/23/2025 7:52 AM  Urgency: elective    Airway not difficult    Staffing  Performed: CRNA   Authorized by: Pasha Tomas DO    Performed by: STARR Rojas-CRNA  Patient location during procedure: OR    Indications and Patient Condition  Indications for airway management: anesthesia  Spontaneous Ventilation: absent  Sedation level: deep  Preoxygenated: yes  Patient position: sniffing  MILS maintained throughout  Mask difficulty assessment: 2 - vent by mask + OA or adjuvant +/- NMBA    Final Airway Details  Final airway type: endotracheal airway      Successful airway: ETT  Cuffed: yes   Successful intubation technique: direct laryngoscopy  Facilitating devices/methods: intubating stylet  Endotracheal tube insertion site: oral  Blade: Archie  Blade size: #3  ETT size (mm): 7.0  Cormack-Lehane Classification: grade I - full view of glottis  Placement verified by: chest auscultation and capnometry   Measured from: teeth  ETT to teeth (cm): 24  Number of attempts at approach: 1

## 2025-01-23 NOTE — OP NOTE
Lumbar 4-5 Minimally Invasive Decompression and Interbody Fusion Operative Note     Date: 2025  OR Location: [facility]    Name: Jacob Boone, : 1960, Age: 64 y.o., MRN: [id number], Sex: male    Diagnosis  Pre-op Diagnosis      * Neurogenic claudication due to lumbar spinal stenosis [M48.062]     * Lumbar radiculopathy [M54.16] Post-op Diagnosis     * Neurogenic claudication due to lumbar spinal stenosis [M48.062]     * Lumbar radiculopathy [M54.16]     Procedures  Lumbar 4-5 Minimally Invasive Decompression and Interbody Fusion  81111 - [place] ARTHRODESIS POSTERIOR INTERBODY 1 [facility] LUMBAR      Surgeons      * Fredy Stratton - Primary    Resident/Fellow/Other Assistant:  Surgeons and Role:     * Cassy Lo PA-C - [facility] Assist    Staff:   Circulator: Hugh Espinosa Person: Jeronimo    Anesthesia Staff: Anesthesiologist: Pasha Tomas DO  CRNA: STARR Rojas-SHARYN    Procedure Summary  Anesthesia: General  ASA: II  Estimated Blood Loss: 75mL  Intra-op Medications:   Administrations occurring from 0730 to 0915 on 25:   Medication Name Total Dose   thrombin-bovine (JMI) 5,000 unit topical solution 5,000 Units   ceFAZolin (Ancef) 2 g in dextrose (iso)  mL 2 g   dexAMETHasone (Decadron) 4 mg/mL 10 mg   fentaNYL PF 0.05 mg/mL 150 mcg   ketorolac (Toradol) 30 mg 30 mg   LR bolus Cannot be calculated   lidocaine (Xylocaine) 1 % 50 mg   methocarbamol (Robaxin) injection 1,000 mg   midazolam (Versed) 1 mg/1 mL 2 mg   ondansetron 2 mg/mL 4 mg   propofol (Diprivan) injection 10 mg/mL 300 mg   rocuronium (ZeMuron) 50 mg/5 mL injection 70 mg   sugammadex (Bridion) 200 mg/2 mL injection 200 mg   tranexamic acid (Cyklokapron) injection 2,000 mg              Anesthesia Record               Intraprocedure I/O Totals          Intake    LR bolus 800.00 mL    Tranexamic Acid 0.00 mL    The total shown is the total volume documented since Anesthesia Start was filed.    ceFAZolin (Ancef) 2 g in dextrose  (iso)  mL 100.00 mL    Total Intake 900 mL       Output    Est. Blood Loss 75 mL    Total Output 75 mL       Net    Net Volume 825 mL          Specimen: No specimens collected              Drains and/or Catheters: * None in log *    Tourniquet Times:         Implants:  Implants       Type Name Action Serial No.      Graft MATRIX KIT, SURGIFLO HEMOSTATIC, GELATIN, W/ THROMBIN, 8ML - [id number] Implanted       NUVASIVE MAXCESS KIT, [id number] Used, Not Implanted      Neuro Interventional Implant ACCESS SYSTEM, PEDICLE, I-PAS III, BEVELED TIP, STERILE - [id number] Implanted      Bone ossifuse 10cc Implanted       [id number] TLX SPINAL FUSION CAGE, 20-DEG Implanted      Screw SCREW, RELINE MAS RED, 6.5X50MM POLY 2C - [id number] Implanted      Screw nuvasive screw shank 6.5x55 Implanted      Screw RELINE MAS MOD, REDUCTION EXTENSION - [id number] Implanted      Neuro Interventional Implant LOREN, RELINE MAS, 5.5 X 45MM - [id number] Implanted      Screw LOREN, RELINE MAS, 5.5 X 50MM, LORDOTIC - [id number] Implanted      Neuro Interventional Implant SCREW, RELINE LOCK, 5.5MM OPEN TULIP - [id number] Implanted      Neuro Interventional Implant K WIRE, NITINOL, PRECEPT, BEVEL TIP - [id number] Implanted               Findings: Severe central lateral recess stenosis L4-5    Indications: Jacob Boone is an 64 y.o. male who is having surgery for Neurogenic claudication due to lumbar spinal stenosis [M48.062]  Lumbar radiculopathy [M54.16].  Patient presented with intractable lumbar radiculopathy and progressively worsening neurogenic claudication.  He failed conservative treatment.  MRI demonstrated grade 1 spondylolisthesis at L4-5 with severe central and lateral recess stenosis.    The patient was seen in the preoperative area. The risks, benefits, complications, treatment options, non-operative alternatives, expected recovery and outcomes were discussed with the patient. The possibilities of reaction to medication, pulmonary  aspiration, injury to surrounding structures, bleeding, recurrent infection, the need for additional procedures, failure to diagnose a condition, and creating a complication requiring transfusion or operation were discussed with the patient. The patient concurred with the proposed plan, giving informed consent.  The site of surgery was properly noted/marked if necessary per policy. The patient has been actively warmed in preoperative area. Preoperative antibiotics have been ordered and given within 1 hours of incision. Venous thrombosis prophylaxis have been ordered including bilateral sequential compression devices    Procedure Details: Patient was brought back to the operating room and general anesthesia was induced.  SCDs were placed on the lower extremities.  Patient was then positioned prone on the Jay frame, all bony prominences were well padded.  A time-out was performed and preoperative antibiotics were given.  The lumbar spine was then prepped and draped in the usual sterile fashion.    Under fluoroscopic guidance bilateral Kavin incisions were made spanning from pedicle to pedicle at L4-5.  Jamshidi needles were advanced along the pedicles in a standard fashion using fluoroscopy.  Guidewires were placed and screw sizes were measured.  6.5 millimeter pedicle screws with reduction towers were placed along the right side, and 6.5 millimeter headless screws with the MAS TLIF retractor blades were placed along the left side.  Screw positioning was checked using fluoroscopy.  The retractor was then opened in the cephalad/caudad direction.  Medial blade was placed and opened along the medial lamina.  The left L4-5 facet joint was exposed along with the adjacent pars interarticularis and medial lamina.    A high-speed bur was used to bur out the facet joint.  A hemilaminectomy was then performed using a high-speed bur extending up to the superior edge of the ligamentum flavum.  An osteotome was used to remove  the medial facet.  The bur was then used to undercut the spinous process and decompress over the top.  This was carried out until the ligamentum was exposed on the contralateral side.  The ligamentum flavum was excised completely using angled curettes and Kerrison rongeurs.  Bilateral decompression of the lateral recesses was then carried out using Kerrison rongeurs until a Kimberly elevator was easily passed through each adjacent neural foramen and the traversing nerve root was freely mobile.  Epidural hemostasis was obtained using bipolar electrocautery and FloSeal.    The L4-5 disc space was then identified.  An annulotomy was performed using a 15 blade.  A thorough diskectomy was performed using a series of curettes and paddle Fidelina.  The endplates were prepared using ring curettes.  Trial sizers were placed and a size 9 x 11 x 31 mm TLX 20 degree expandable cage was selected.  The disc space was irrigated, it was then packed with 10 cubic centimeters of Ossifuse bone graft.  The cage was then inserted into the disc space and opened under fluoroscopic guidance.  The cage was then backfilled with more osteo cell.  The  handle was removed.  Meticulous epidural hemostasis was obtained with bipolar electrocautery and FloSeal.  There was greatly improved alignment and disc space height following placement of the cage.    The retractor was then closed and removed.  Reduction towers were placed along the headless pedicle screws.  Calipers were used to measure tari length and appropriate size rods were placed bilaterally through the reduction towers.  Locking screws were placed and then finally tightened using the torque wrench.  Reduction towers and tari inserters were removed.  Final AP and lateral images were then obtained.    The wounds were then irrigated once again and hemostasis was obtained.  The wounds were closed in layers.  Dermabond Prineo and dry sterile dressings were then applied.    Patient was  then transferred off the OR table and awakened.  There were no complications during the case.    I performed this operation with a physician's assistant, as no qualified resident was available.  Cassy Lo was the full and primary assistant during the entire case.    Complications:  None; patient tolerated the procedure well.    Disposition: PACU - hemodynamically stable.  Condition: stable           Attending Attestation: I was present for the entire procedure.    Fredy Stratton  Phone Number: [phone]

## 2025-01-23 NOTE — DISCHARGE INSTRUCTIONS
Activity  Progressively walk 2 times a day.  Weight bearing as tolerated.  No pushing, pulling, or lifting objects greater than 10 pounds.  No excessive bending, twisting. No heavy house or yard work.  You may shower when there is no drainage from the incision site for 48 hours.  You may not return to school/work until your follow up appointment.  You may not drive until your follow up appointment or while taking narcotic medication.    Wound Type: Surgical Incision  -Change the dressing daily and continue until the incision is no longer draining.   -Once the incision  has been dry for 48 hours, you may leave the dressing off and the site open to air.  -Cover the wound with an abdominal pad and secure it with paper tape around the edges.  -If there is a Prineo/Exofin dressing (mesh strip) over your incision, do not remove it with your     dressing changes. The dressing will slowly lift away from the skin over time.   -No Lotions or Creams on or around the incision site.  -No tub soaks  -You may use heat or ice to your incision sites and or back as needed for comfort.    Call the office if: [phone] ext [phone]  You are breathing faster than normal.  Fever of 100.4 F or higher.  Chills  Urinating less than 4 times per day.  Acting very sleepy and difficult to awaken.  Vomiting and not able to eat or drink for 12 hours  3 or more loose, watery bowel movements in 24 hours (Diarrhea)  Concerns over the appearance of your incision.    Return immediately to the ER:  Persistent bilateral leg pain and or numbness >24 hours.  Perineal numbness/sensory loss  Urinary retention >12 hours  Loss of bowel/ bladder control

## 2025-01-23 NOTE — ANESTHESIA PREPROCEDURE EVALUATION
Patient: Jacob Boone    Procedure Information       Date/Time: 01/23/25 0730    Procedure: Lumbar 4-5 Minimally Invasive Decompression and Interbody Fusion (Spine Lumbar) - c-arm, nuvasive    Location: [facility] / [facility]    Surgeons: Fredy Stratton MD            Relevant Problems   Cardiac   (+) Essential hypertension, benign   (+) Hyperlipidemia   (+) Murmur   (+) Paroxysmal atrial fibrillation (Multi)      Neuro   (+) Chronic headaches   (+) Lumbar radiculopathy      Musculoskeletal   (+) Lumbar spondylosis   (+) Primary osteoarthritis of left knee      Skin   (+) Basal cell carcinoma of face       Clinical information reviewed:   Tobacco  Allergies  Meds  Problems  Med Hx  Surg Hx   Fam Hx  Soc   Hx        NPO Detail:  NPO/Void Status  Carbohydrate Drink Given Prior to Surgery? : N  Date of Last Liquid: 01/23/25  Time of Last Liquid: 0400  Date of Last Solid: 01/22/25  Time of Last Solid: 2130  Last Intake Type: Clear fluids  Time of Last Void: 0630         Physical Exam    Airway  Mallampati: II  TM distance: >3 FB     Cardiovascular    Dental    Pulmonary    Abdominal            Anesthesia Plan    History of general anesthesia?: yes  History of complications of general anesthesia?: no    ASA 2     general     intravenous induction   Anesthetic plan and risks discussed with patient.

## 2025-01-23 NOTE — CONSULTS
Inpatient consult to Medicine  Consult performed by: STARR Rome-CNP  Consult ordered by: Cassy Lo PA-C          Reason For Consult  Perioperative medical management HTN.     History Of Present Illness  Jacob Boone is a 64 y.o. male with history of HTN and atrial fibrillation who presented today with scheduled lumbar surgery with Dr. Stratton. He has had low back pain for some time now, worsened over the past 6 months or so. He was having issues with legs buckling, no falls. Pain was effecting his ADL's and surgery was recommended. He is currently resting in bed, pain is moderate, he is complaining of back spasms. He denies any SOB, chest pain, abd pain, nausea, vomiting, diarrhea or urinary symptoms.      Past Medical History  He has a past medical history of Atrial fibrillation and flutter (2019), Chronic lower back pain, Chronic neck pain, H/O vertigo, Heart murmur, History of sepsis, Hypertension, Migraines, PONV (postoperative nausea and vomiting), and Sleep apnea.    Surgical History  He has a past surgical history that includes Neck surgery (04/26/2018); Other surgical history (12/17/2021); Cardioversion (2020); and Foot neuroma surgery (Right).     Social History  Home with his spouse. Denies tobacco use. Rare ETOH. No drug use.     Family History    Non-contributory to current illness.      Allergies  Heparin    Review of Systems    Please see pertinent positives in the HPI and past medical and surgical histories.      Physical Exam    General.: Pleasant, awake, alert.   HEENT: PERRLA, no facial asymmetry noted. Normocephalic, mucous membranes moist.   Neck: No JVD, supple.  Cardiovascular: Regular rate and rhythm. Normal S1 and S2. No murmurs, rubs or gallops.   Respiratory: Clear to auscultation on 2L NC.    Abdomen: Soft, round, non-tender to palpation. Bowel sounds present and normoactive.  Extremities: No peripheral cyanosis. No edema.   Neurologic: Alert and oriented to  person, place and time. Normal sensation.   Dermatologic: No rash, ecchymosis, or jaundice. Lumbar incision covered with dressing.   Psychological: Appropriate affect and behavior.        Last Recorded Vitals  /83 (BP Location: Right arm, Patient Position: Lying)   Pulse 66   Temp 37.8 °C (100 °F) (Temporal)   Resp 16   Wt 101 kg (221 lb 15.7 oz)   SpO2 98%     Relevant Results    Pre-op labs reviewed, unremarkable.      Assessment/Plan     HTN  -Continue lisinopril as his BP is on the higher side.   -Monitor BP closely post op.     Hx Atrial Fibrillation  -S/P cardioversion 2020.   -No recurrence in his afib.   -On no OAC or rate control medications.   -Monitor.     JOSEMANUEL  -Noncompliant with CPAP.   -2L NC PRN.     Hx Sepsis   -Had sepsis with cardiac arrest, intubation and HD back in 2019.    Lumbar Spinal Stenosis with Neurogenic Claudication  -POD #0 L 4-5 minimally invasive decompression and interbody fusion.   -Continue post op care per surgery.   -Pain control per surgery.   -PT/OT evals.   -Mobilize.   -Encourage IS, pulmonary hygiene.   -Monitor closely for post op anemia, hypotension and renal insufficiency.     DVT Risk  -Pharm agent per the surgeon.   -SCDs.     Plan  Case and plan was discussed with patient, he is agreeable.   Case and plan was also discussed with my collaborating physician.     Time spent 45 minutes.     STARR Rome-CNP

## 2025-01-23 NOTE — CARE PLAN
The patient's goals for the shift include Assist with pain control    The clinical goals for the shift include Assist with pain control, ambulate with staff, PT/OT       Problem: Pain - Adult  Goal: Verbalizes/displays adequate comfort level or baseline comfort level  Outcome: Progressing     Problem: Safety - Adult  Goal: Free from fall injury  Outcome: Progressing     Problem: Discharge Planning  Goal: Discharge to home or other facility with appropriate resources  Outcome: Progressing     Problem: Chronic Conditions and Co-morbidities  Goal: Patient's chronic conditions and co-morbidity symptoms are monitored and maintained or improved  Outcome: Progressing     Problem: Nutrition  Goal: Nutrient intake appropriate for maintaining nutritional needs  Outcome: Progressing

## 2025-01-24 VITALS
HEART RATE: 64 BPM | DIASTOLIC BLOOD PRESSURE: 67 MMHG | OXYGEN SATURATION: 96 % | TEMPERATURE: 98.1 F | HEIGHT: 72 IN | BODY MASS INDEX: 30.07 KG/M2 | RESPIRATION RATE: 18 BRPM | WEIGHT: 221.98 LBS | SYSTOLIC BLOOD PRESSURE: 152 MMHG

## 2025-01-24 PROBLEM — M54.16 LUMBAR RADICULOPATHY: Status: RESOLVED | Noted: 2025-01-06 | Resolved: 2025-01-24

## 2025-01-24 LAB
ANION GAP SERPL CALCULATED.3IONS-SCNC: 10 MMOL/L (ref 10–20)
BUN SERPL-MCNC: 19 MG/DL (ref 6–23)
CALCIUM SERPL-MCNC: 9 MG/DL (ref 8.6–10.3)
CHLORIDE SERPL-SCNC: 105 MMOL/L (ref 98–107)
CO2 SERPL-SCNC: 24 MMOL/L (ref 21–32)
CREAT SERPL-MCNC: 0.93 MG/DL (ref 0.5–1.3)
EGFRCR SERPLBLD CKD-EPI 2021: >90 ML/MIN/1.73M*2
ERYTHROCYTE [DISTWIDTH] IN BLOOD BY AUTOMATED COUNT: 12.9 % (ref 11.5–14.5)
GLUCOSE SERPL-MCNC: 152 MG/DL (ref 74–99)
HCT VFR BLD AUTO: 40.1 % (ref 41–52)
HGB BLD-MCNC: 13.6 G/DL (ref 13.5–17.5)
MCH RBC QN AUTO: 31.3 PG (ref 26–34)
MCHC RBC AUTO-ENTMCNC: 33.9 G/DL (ref 32–36)
MCV RBC AUTO: 92 FL (ref 80–100)
NRBC BLD-RTO: 0 /100 WBCS (ref 0–0)
PLATELET # BLD AUTO: 168 X10*3/UL (ref 150–450)
POTASSIUM SERPL-SCNC: 4.8 MMOL/L (ref 3.5–5.3)
RBC # BLD AUTO: 4.34 X10*6/UL (ref 4.5–5.9)
SODIUM SERPL-SCNC: 134 MMOL/L (ref 136–145)
WBC # BLD AUTO: 12.6 X10*3/UL (ref 4.4–11.3)

## 2025-01-24 PROCEDURE — 85027 COMPLETE CBC AUTOMATED: CPT | Performed by: NURSE PRACTITIONER

## 2025-01-24 PROCEDURE — 97110 THERAPEUTIC EXERCISES: CPT | Mod: GP,CQ

## 2025-01-24 PROCEDURE — 97535 SELF CARE MNGMENT TRAINING: CPT | Mod: GO,CO

## 2025-01-24 PROCEDURE — 97116 GAIT TRAINING THERAPY: CPT | Mod: GP,CQ

## 2025-01-24 PROCEDURE — 7100000011 HC EXTENDED STAY RECOVERY HOURLY - NURSING UNIT

## 2025-01-24 PROCEDURE — 36415 COLL VENOUS BLD VENIPUNCTURE: CPT | Performed by: NURSE PRACTITIONER

## 2025-01-24 PROCEDURE — 82374 ASSAY BLOOD CARBON DIOXIDE: CPT | Performed by: NURSE PRACTITIONER

## 2025-01-24 PROCEDURE — 2500000004 HC RX 250 GENERAL PHARMACY W/ HCPCS (ALT 636 FOR OP/ED): Performed by: PHYSICIAN ASSISTANT

## 2025-01-24 PROCEDURE — 2500000001 HC RX 250 WO HCPCS SELF ADMINISTERED DRUGS (ALT 637 FOR MEDICARE OP): Performed by: PHYSICIAN ASSISTANT

## 2025-01-24 PROCEDURE — 2500000001 HC RX 250 WO HCPCS SELF ADMINISTERED DRUGS (ALT 637 FOR MEDICARE OP): Performed by: ORTHOPAEDIC SURGERY

## 2025-01-24 RX ADMIN — METHOCARBAMOL 1000 MG: 500 TABLET ORAL at 09:26

## 2025-01-24 RX ADMIN — CALCIUM CARBONATE (ANTACID) CHEW TAB 500 MG 1000 MG: 500 CHEW TAB at 09:32

## 2025-01-24 RX ADMIN — DEXAMETHASONE SODIUM PHOSPHATE 8 MG: 4 INJECTION, SOLUTION INTRAMUSCULAR; INTRAVENOUS at 05:51

## 2025-01-24 RX ADMIN — ACETAMINOPHEN 975 MG: 325 TABLET ORAL at 11:56

## 2025-01-24 RX ADMIN — OXYCODONE 10 MG: 5 TABLET ORAL at 09:15

## 2025-01-24 RX ADMIN — ACETAMINOPHEN 975 MG: 325 TABLET ORAL at 03:45

## 2025-01-24 RX ADMIN — KETOROLAC TROMETHAMINE 15 MG: 30 INJECTION, SOLUTION INTRAMUSCULAR at 11:56

## 2025-01-24 RX ADMIN — KETOROLAC TROMETHAMINE 15 MG: 30 INJECTION, SOLUTION INTRAMUSCULAR at 05:51

## 2025-01-24 RX ADMIN — POLYETHYLENE GLYCOL 3350 17 G: 17 POWDER, FOR SOLUTION ORAL at 09:26

## 2025-01-24 SDOH — HEALTH STABILITY: PHYSICAL HEALTH: ON AVERAGE, HOW MANY DAYS PER WEEK DO YOU ENGAGE IN MODERATE TO STRENUOUS EXERCISE (LIKE A BRISK WALK)?: 0 DAYS

## 2025-01-24 SDOH — SOCIAL STABILITY: SOCIAL NETWORK: HOW OFTEN DO YOU ATTEND CHURCH OR RELIGIOUS SERVICES?: PATIENT DECLINED

## 2025-01-24 SDOH — HEALTH STABILITY: PHYSICAL HEALTH
HOW OFTEN DO YOU NEED TO HAVE SOMEONE HELP YOU WHEN YOU READ INSTRUCTIONS, PAMPHLETS, OR OTHER WRITTEN MATERIAL FROM YOUR DOCTOR OR PHARMACY?: NEVER

## 2025-01-24 SDOH — HEALTH STABILITY: MENTAL HEALTH
DO YOU FEEL STRESS - TENSE, RESTLESS, NERVOUS, OR ANXIOUS, OR UNABLE TO SLEEP AT NIGHT BECAUSE YOUR MIND IS TROUBLED ALL THE TIME - THESE DAYS?: NOT AT ALL

## 2025-01-24 SDOH — SOCIAL STABILITY: SOCIAL NETWORK: HOW OFTEN DO YOU GET TOGETHER WITH FRIENDS OR RELATIVES?: MORE THAN THREE TIMES A WEEK

## 2025-01-24 SDOH — HEALTH STABILITY: PHYSICAL HEALTH: ON AVERAGE, HOW MANY MINUTES DO YOU ENGAGE IN EXERCISE AT THIS LEVEL?: 0 MIN

## 2025-01-24 SDOH — SOCIAL STABILITY: SOCIAL NETWORK
DO YOU BELONG TO ANY CLUBS OR ORGANIZATIONS SUCH AS CHURCH GROUPS, UNIONS, FRATERNAL OR ATHLETIC GROUPS, OR SCHOOL GROUPS?: PATIENT DECLINED

## 2025-01-24 SDOH — SOCIAL STABILITY: SOCIAL INSECURITY: ARE YOU MARRIED, WIDOWED, DIVORCED, SEPARATED, NEVER MARRIED, OR LIVING WITH A PARTNER?: MARRIED

## 2025-01-24 SDOH — SOCIAL STABILITY: SOCIAL NETWORK: HOW OFTEN DO YOU ATTEND MEETINGS OF THE CLUBS OR ORGANIZATIONS YOU BELONG TO?: PATIENT DECLINED

## 2025-01-24 SDOH — SOCIAL STABILITY: SOCIAL NETWORK
IN A TYPICAL WEEK, HOW MANY TIMES DO YOU TALK ON THE PHONE WITH FAMILY, FRIENDS, OR NEIGHBORS?: MORE THAN THREE TIMES A WEEK

## 2025-01-24 ASSESSMENT — COGNITIVE AND FUNCTIONAL STATUS - GENERAL
MOBILITY SCORE: 18
DRESSING REGULAR LOWER BODY CLOTHING: A LITTLE
MOVING TO AND FROM BED TO CHAIR: A LITTLE
CLIMB 3 TO 5 STEPS WITH RAILING: A LITTLE
STANDING UP FROM CHAIR USING ARMS: A LITTLE
HELP NEEDED FOR BATHING: A LITTLE
WALKING IN HOSPITAL ROOM: A LITTLE
MOVING FROM LYING ON BACK TO SITTING ON SIDE OF FLAT BED WITH BEDRAILS: A LITTLE
DAILY ACTIVITIY SCORE: 20
TURNING FROM BACK TO SIDE WHILE IN FLAT BAD: A LITTLE
DRESSING REGULAR UPPER BODY CLOTHING: A LITTLE
TOILETING: A LITTLE

## 2025-01-24 ASSESSMENT — PAIN SCALES - GENERAL
PAINLEVEL_OUTOF10: 4
PAINLEVEL_OUTOF10: 7
PAINLEVEL_OUTOF10: 3
PAINLEVEL_OUTOF10: 3
PAINLEVEL_OUTOF10: 4

## 2025-01-24 ASSESSMENT — PAIN DESCRIPTION - LOCATION: LOCATION: BACK

## 2025-01-24 ASSESSMENT — PAIN - FUNCTIONAL ASSESSMENT
PAIN_FUNCTIONAL_ASSESSMENT: 0-10
PAIN_FUNCTIONAL_ASSESSMENT: 0-10

## 2025-01-24 NOTE — PROGRESS NOTES
Occupational Therapy    OT Treatment    Patient Name: Jacob Boone  MRN: [id number]  Department: [address]  Room: [address] [place] [address]  Today's Date: 1/24/2025  Time Calculation  Start Time: 0716  Stop Time: 0750  Time Calculation (min): 34 min        Assessment:  OT Assessment: Pt making good progress with POC goals, should continue to progess well at home with  services  Prognosis: Excellent  Barriers to Discharge Home: No anticipated barriers  Evaluation/Treatment Tolerance: Patient tolerated treatment well  End of Session Communication: PCT/NA/CTA  End of Session Patient Position: Up in chair, Alarm on (All needs in reach)  OT Assessment Results: Decreased ADL status, Decreased upper extremity strength, Decreased endurance, Decreased functional mobility, Decreased IADLs  Prognosis: Excellent  Evaluation/Treatment Tolerance: Patient tolerated treatment well  Strengths: Ability to acquire knowledge, Attitude of self  Plan:  Treatment Interventions: ADL retraining, Functional transfer training, UE strengthening/ROM, Endurance training, Patient/family training, Neuromuscular reeducation, Equipment evaluation/education, Compensatory technique education  OT Frequency: 4 times per week  OT Discharge Recommendations: Low intensity level of continued care  Equipment Recommended upon Discharge: Wheeled walker  OT Recommended Transfer Status: Assist of 1  OT - OK to Discharge: Yes  Treatment Interventions: ADL retraining, Functional transfer training, UE strengthening/ROM, Endurance training, Patient/family training, Neuromuscular reeducation, Equipment evaluation/education, Compensatory technique education    Subjective   Previous Visit Info:  OT Last Visit  OT Received On: 01/24/25  General:  General  Reason for Referral: recent surgery; s/p Lumbar 4-5 Minimally Invasive Decompression and Interbody Fusion on 1/23/25  Referred By: Dr. Stratton  Past Medical History Relevant to Rehab: afib, LBP, neck pain, vertigo, heart murmur,  sepsis, HTN, sleep apnea, cardioversion  Prior to Session Communication: Bedside nurse  Patient Position Received: Bed, 3 rail up, Alarm on  General Comment: Agreeable to treatment.  Precautions:  Hearing/Visual Limitations: glasses  Medical Precautions: Fall precautions  Post-Surgical Precautions: Spinal precautions  Precautions Comment: Spine precaution packet reviewed and provide to pt.      Pain:  Pain Assessment  Pain Assessment: 0-10  0-10 (Numeric) Pain Score: 3  Pain Type: Surgical pain  Pain Location: Back  Pain Interventions: Repositioned  Response to Interventions: Content/relaxed    Objective    Cognition:  Cognition  Overall Cognitive Status: Within Functional Limits     Activities of Daily Living: Grooming  Grooming Level of Assistance: Close supervision  Grooming Where Assessed: Standing sinkside  Grooming Comments: hand hygiene    UE Dressing  UE Dressing Level of Assistance: Setup  UE Dressing Where Assessed: Chair  UE Dressing Comments: pt donned overhead shirt    LE Dressing  Pants Level of Assistance: Close supervision  Sock Level of Assistance: Close supervision  LE Dressing Where Assessed: Chair  LE Dressing Comments: Pt donned undewear, pants using reacher, doffed/donned socks using reacher, sock aide. Therapist provides reacher, purchase info for sock aid, instructs in safety with LB dressing, effective use of AE to adhere to spine precautions.    Toileting  Toileting Level of Assistance: Contact guard  Where Assessed: Toilet  Toileting Comments: standing for urination, FWW over toilet  Functional Standing Tolerance:     Bed Mobility/Transfers: Bed Mobility 1  Bed Mobility 1: Supine to sitting  Level of Assistance 1: Close supervision  Bed Mobility Comments 1: HOB elevated, use of bedrail for log roll    Transfer 1  Transfer From 1: Bed to  Transfer to 1: Chair with arms  Technique 1: To right  Transfer Device 1: Walker  Transfer Level of Assistance 1: Minimum assistance  Trials/Comments 1:  cues for safe transfer techniques  Transfers 2  Transfer From 2: Chair with arms to  Transfer to 2: Chair with arms  Technique 2: To right, To left  Transfer Device 2: Walker  Transfer Level of Assistance 2: Contact guard    Shower Transfers  Shower Transfers Comments: Therapist educates in correct form, safety, adherence to spine precautions with showering task.    Car Transfers  Car Transfers Comments: Therapist educates in correct form,      Functional Mobility:  Functional Mobility 1  Surface 1: Level tile  Device 1: Rolling walker  Assistance 1: Minimum assistance  Quality of Functional Mobility 1:  (slow, cautious steps, fair balance, fair mobility tolerance, MIn A, advancing to Close S.)    Outcome Measures:[facility] Daily Activity  Putting on and taking off regular lower body clothing: A little  Bathing (including washing, rinsing, drying): A little  Putting on and taking off regular upper body clothing: A little  Toileting, which includes using toilet, bedpan or urinal: A little  Taking care of personal grooming such as brushing teeth: None  Eating Meals: None  Daily Activity - Total Score: 20      Education Documentation  Handouts, taught by SANDRA Lam at 1/24/2025  9:55 AM.  Learner: Patient  Readiness: Acceptance  Method: Explanation, Handout  Response: Verbalizes Understanding, Demonstrated Understanding  Comment: AE use with LB dressingsafe transfer techniques/walker useSpine precautions    Precautions, taught by SANDRA Lam at 1/24/2025  9:55 AM.  Learner: Patient  Readiness: Acceptance  Method: Explanation, Handout  Response: Verbalizes Understanding, Demonstrated Understanding  Comment: AE use with LB dressingsafe transfer techniques/walker useSpine precautions    ADL Training, taught by SANDRA Lam at 1/24/2025  9:55 AM.  Learner: Patient  Readiness: Acceptance  Method: Explanation, Handout  Response: Verbalizes Understanding, Demonstrated Understanding  Comment: AE use with LB  dressingsafe transfer techniques/walker useSpine precautions    Education Comments  No comments found.      Goals:  Encounter Problems       Encounter Problems (Active)       OT Goals       ADLS (Progressing)       Start:  01/23/25    Expected End:  01/30/25       Patient will complete ADL tasks, with set-up using AE need in order to increase patient's safety and independence with self-care tasks.           Functional Transfers (Progressing)       Start:  01/23/25    Expected End:  01/30/25       Patient will complete functional transfers with set-up using least restrictive device, in order to increase patient's safety and independence with daily tasks.           Precautions (Progressing)       Start:  01/23/25    Expected End:  01/30/25       Patient will independently verbalize 3/3 post op precautions in preparation for ADLS.

## 2025-01-24 NOTE — PROGRESS NOTES
Physical Therapy    Physical Therapy Treatment    Patient Name: Jacob Boone  MRN: [id number]  Department: [address]  Room: [address] [place] [address]  Today's Date: 1/24/2025  Time Calculation  Start Time: 0812  Stop Time: 0850  Time Calculation (min): 38 min         Assessment/Plan   PT Assessment  PT Assessment Results: Decreased strength, Decreased endurance, Impaired balance, Decreased mobility, Decreased coordination, Decreased skin integrity, Orthopedic restrictions, Pain  Rehab Prognosis: Good  Barriers to Discharge Home: No anticipated barriers  Evaluation/Treatment Tolerance: Patient tolerated treatment well  Medical Staff Made Aware: Yes  Strengths: Ability to acquire knowledge  Barriers to Participation: Comorbidities  End of Session Communication: Bedside nurse  Assessment Comment: Decreased pain with activities. Gait with RW with bMin A up/ down 4 steps with contract guard assist  End of Session Patient Position: Bed, 3 rail up, Alarm on (Needs at reach)     PT Plan  Treatment/Interventions: Bed mobility, Transfer training, Gait training, Stair training, Balance training, Strengthening, Therapeutic exercise, Therapeutic activity, Home exercise program  PT Plan: Ongoing PT  PT Frequency: BID  PT Discharge Recommendations: Low intensity level of continued care  Equipment Recommended upon Discharge: Wheeled walker  PT Recommended Transfer Status: Assist x1, Assistive device  PT - OK to Discharge: Yes      General Visit Information:   PT  Visit  PT Received On: 01/24/25  General  Reason for Referral: recent surgery; s/p Lumbar 4-5 Minimally Invasive Decompression and Interbody Fusion on 1/23/25  Referred By: Dr. Stratton  Past Medical History Relevant to Rehab: afib, LBP, neck pain, vertigo, heart murmur, sepsis, HTN, sleep apnea, cardioversion  Prior to Session Communication: Bedside nurse  Patient Position Received: Up in chair, Alarm on  Preferred Learning Style: verbal  General Comment: Agreeable to  treatment.    Subjective   Precautions:  Precautions  Hearing/Visual Limitations: glasses  Medical Precautions: Fall precautions  Post-Surgical Precautions: Spinal precautions  Precautions Comment: Able to recite 3/3 spine precautions            Objective   Pain:  Pain Assessment  Pain Assessment: 0-10  0-10 (Numeric) Pain Score: 3  Pain Type: Acute pain, Surgical pain  Pain Location: Back  Pain Orientation: Lower  Pain Radiating Towards: L LE  Pain Onset: Gradual  Clinical Progression: Gradually improving  Pain Interventions: Repositioned  Response to Interventions: Content/relaxed  Cognition:  Cognition  Overall Cognitive Status: Within Functional Limits  Orientation Level: Oriented X4  Cognition Comments: alert,pleasant and cooperative  Coordination:     Postural Control:  Dynamic Standing Balance  Dynamic Standing-Balance Support: No upper extremity supported  Dynamic Standing-Level of Assistance: Minimum assistance  Dynamic Standing-Comments: Fair  Extremity/Trunk Assessments:    Activity Tolerance:  Activity Tolerance  Endurance: Decreased tolerance for upright activites  Activity Tolerance Comments: Fair (No spasms today)  Treatments:       Bed Mobility 1  Bed Mobility 1: Sitting to supine  Level of Assistance 1: Contact guard  Bed Mobility Comments 1: Reverse log roll back to bed with slight assist B LEs into bed    Ambulation/Gait Training  Ambulation/Gait Training Performed: Yes  Ambulation/Gait Training 1  Surface 1: Level tile  Device 1: Rolling walker  Assistance 1: Minimum assistance, Contact guard  Comments/Distance (ft) 1: Patient able to walk 150-175 ' x 2 with RW with slow reciprocal gait with Min/ contact guard assist with cues to stay in frame of RW for upright posture Seated rest between walks  Transfers  Transfer: Yes  Transfer 1  Transfer From 1: Chair with arms to  Transfer to 1: Stand  Technique 1: Sit to stand  Transfer Device 1: Walker  Transfer Level of Assistance 1: Minimum  assistance  Trials/Comments 1: STS x 2 with Min A with cues for safe transfer technique  Transfers 2  Transfer From 2: Stand to  Transfer to 2: Sit, Chair with arms  Technique 2: Stand to sit  Transfer Device 2: Walker  Transfer Level of Assistance 2: Minimum assistance  Trials/Comments 2: STS x 2 with Min A with cues for safe transfer technique    Stairs  Stairs: Yes  Stairs  Assistance 1: Contact guard  Comment/Number of Steps 1: up 4 steps with rail on left with contact guard assist Up 4 steps with R LE then L LE Down 4 steps with rail on right. Diown L LR then R LE with contact guard assist    Outcome Measures:  [facility] Basic Mobility  Turning from your back to your side while in a flat bed without using bedrails: A little  Moving from lying on your back to sitting on the side of a flat bed without using bedrails: A little  Moving to and from bed to chair (including a wheelchair): A little  Standing up from a chair using your arms (e.g. wheelchair or bedside chair): A little  To walk in hospital room: A little  Climbing 3-5 steps with railing: A little  Basic Mobility - Total Score: 18    Education Documentation  Handouts, taught by Elvira Herring PTA at 1/24/2025 10:23 AM.  Learner: Patient  Readiness: Acceptance  Method: Explanation  Response: Verbalizes Understanding    Precautions, taught by Elvira Herring PTA at 1/24/2025 10:23 AM.  Learner: Patient  Readiness: Acceptance  Method: Explanation  Response: Verbalizes Understanding    Body Mechanics, taught by Elvira Herring PTA at 1/24/2025 10:23 AM.  Learner: Patient  Readiness: Acceptance  Method: Explanation  Response: Verbalizes Understanding    Home Exercise Program, taught by Elvira Herring PTA at 1/24/2025 10:23 AM.  Learner: Patient  Readiness: Acceptance  Method: Explanation  Response: Verbalizes Understanding    Mobility Training, taught by Elvira Herring PTA at 1/24/2025 10:23 AM.  Learner: Patient  Readiness: Acceptance  Method:  Explanation  Response: Verbalizes Understanding    Education Comments  No comments found.        OP EDUCATION:       Encounter Problems       Encounter Problems (Active)       Mobility       STG - Patient will ambulate 100' w/ RW and CGA, no buckling (Progressing)       Start:  01/23/25    Expected End:  01/24/25            STG - Patient will ascend and descend four to six stairs w/ 1 rail and min assist (Progressing)       Start:  01/23/25    Expected End:  01/24/25               PT Transfers       STG - Patient to transfer to and from sit to supine IND via logroll (Progressing)       Start:  01/23/25    Expected End:  01/24/25            STG - Patient will transfer sit to and from stand MOD I w/ safe techniqeuue (Progressing)       Start:  01/23/25    Expected End:  01/24/25               Safety       LTG - Patient will adhere to spine precautions during functional mobility (Progressing)       Start:  01/23/25    Expected End:  01/24/25

## 2025-01-24 NOTE — PROGRESS NOTES
01/24/25 1052   Discharge Planning   Living Arrangements Spouse/significant other   Support Systems Spouse/significant other   Assistance Needed independent   Type of Residence Private residence   Do you have animals or pets at home? No   Who is requesting discharge planning? Patient   Home or Post Acute Services None   Expected Discharge Disposition Home   Does the patient need discharge transport arranged? No     Discharge written.  Home today at noon.  No needs

## 2025-01-24 NOTE — DISCHARGE SUMMARY
Discharge Diagnosis  Neurogenic claudication due to lumbar spinal stenosis    Issues Requiring Follow-Up  Lumbar surgery    Test Results Pending At Discharge  Pending Labs       No current pending labs.            Hospital Course   unremarkable    Pertinent Physical Exam At Time of Discharge  dressing c/d/i  5/5 strength b/l lower ext  SILT L2-S1      Home Medications     Medication List      START taking these medications     acetaminophen 500 mg tablet; Commonly known as: Tylenol Extra Strength;   Take 2 tablets (1,000 mg) by mouth every 8 hours for 14 days.   methocarbamol 500 mg tablet; Commonly known as: Robaxin; Take 1-2 tabs   every 8 hours as needed for spasms   oxyCODONE 5 mg immediate release tablet; Commonly known as: Roxicodone;   Take 1 tablet (5 mg) by mouth every 4 hours if needed for severe pain (7 -   10) for up to 7 days.   polyethylene glycol 17 gram/dose powder; Commonly known as: Miralax; Mix   17 g of powder and drink 2 times a day as needed (constipation).     CHANGE how you take these medications     lisinopril 10 mg tablet; Take 1 tablet (10 mg) by mouth once daily.;   What changed: when to take this     CONTINUE taking these medications     MAGNESIUM CITRATE ORAL     STOP taking these medications     chlorhexidine 0.12 % solution; Commonly known as: Peridex   ibuprofen 200 mg tablet       Outpatient Follow-Up  Future Appointments   Date Time Provider Department Center   2/6/2025 11:45 AM Erica Ortiz MD [address]   2/12/2025  2:30 PM Cassy Lo PA-C [id number] [facility]   3/28/2025  9:30 AM Anaid Thorpe MD [id number] [place]       Fredy Stratton MD

## 2025-01-24 NOTE — PROGRESS NOTES
Doing well today  Leg pain feels much better  +LBP but controlled well    dressing c/d/i  5/5 strength b/l lower ext  SILT L2-S1    Plan:  OOB ad lluvia  PT/OT  Dc home today

## 2025-01-24 NOTE — CARE PLAN
The patient's goals for the shift include Assist with pain control    The clinical goals for the shift include pain management, safety awareness, IV atb and fluids      Problem: Safety - Adult  Goal: Free from fall injury  Outcome: Progressing     Problem: Chronic Conditions and Co-morbidities  Goal: Patient's chronic conditions and co-morbidity symptoms are monitored and maintained or improved  Outcome: Progressing     Problem: Nutrition  Goal: Nutrient intake appropriate for maintaining nutritional needs  Outcome: Progressing     Problem: Pain  Goal: Takes deep breaths with improved pain control throughout the shift  Outcome: Progressing  Goal: Turns in bed with improved pain control throughout the shift  Outcome: Progressing  Goal: Walks with improved pain control throughout the shift  Outcome: Progressing  Goal: Performs ADL's with improved pain control throughout shift  Outcome: Progressing  Goal: Participates in PT with improved pain control throughout the shift  Outcome: Progressing  Goal: Free from opioid side effects throughout the shift  Outcome: Progressing  Goal: Free from acute confusion related to pain meds throughout the shift  Outcome: Progressing

## 2025-01-24 NOTE — CARE PLAN
The patient's goals for the shift include Assist with pain control, therapy, rest, safety, discharge home.     The clinical goals for the shift include manage pain, monitor labs and VS, IV antibx, PT/OT, maintain safety, no falls, promote rest, discharge home.    No barriers, patient discharged.       Problem: Safety - Adult  Goal: Free from fall injury  Outcome: Adequate for Discharge     Problem: Discharge Planning  Goal: Discharge to home or other facility with appropriate resources  Outcome: Adequate for Discharge     Problem: Chronic Conditions and Co-morbidities  Goal: Patient's chronic conditions and co-morbidity symptoms are monitored and maintained or improved  Outcome: Adequate for Discharge     Problem: Nutrition  Goal: Nutrient intake appropriate for maintaining nutritional needs  Outcome: Adequate for Discharge     Problem: Pain  Goal: Takes deep breaths with improved pain control throughout the shift  Outcome: Adequate for Discharge  Goal: Turns in bed with improved pain control throughout the shift  Outcome: Adequate for Discharge  Goal: Walks with improved pain control throughout the shift  Outcome: Adequate for Discharge  Goal: Performs ADL's with improved pain control throughout shift  Outcome: Adequate for Discharge  Goal: Participates in PT with improved pain control throughout the shift  Outcome: Adequate for Discharge  Goal: Free from opioid side effects throughout the shift  Outcome: Adequate for Discharge  Goal: Free from acute confusion related to pain meds throughout the shift  Outcome: Adequate for Discharge     Problem: Fall/Injury  Goal: Not fall by end of shift  Outcome: Adequate for Discharge  Goal: Be free from injury by end of the shift  Outcome: Adequate for Discharge  Goal: Verbalize understanding of personal risk factors for fall in the hospital  Outcome: Adequate for Discharge  Goal: Verbalize understanding of risk factor reduction measures to prevent injury from fall in the  home  Outcome: Adequate for Discharge  Goal: Use assistive devices by end of the shift  Outcome: Adequate for Discharge  Goal: Pace activities to prevent fatigue by end of the shift  Outcome: Adequate for Discharge

## 2025-02-06 ENCOUNTER — APPOINTMENT (OUTPATIENT)
Dept: CARDIOLOGY | Facility: CLINIC | Age: 65
End: 2025-02-06
Payer: MEDICARE

## 2025-02-06 PROBLEM — Z77.29 EXPOSURE TO POTENTIALLY HAZARDOUS SUBSTANCE: Status: ACTIVE | Noted: 2025-02-06

## 2025-02-06 PROBLEM — I47.29: Status: ACTIVE | Noted: 2025-02-06

## 2025-02-06 PROBLEM — L57.0 ACTINIC KERATOSIS: Status: ACTIVE | Noted: 2025-02-06

## 2025-02-06 PROBLEM — M19.041 OSTEOARTHRITIS OF BOTH HANDS: Status: ACTIVE | Noted: 2025-02-06

## 2025-02-06 PROBLEM — R52 PAIN: Status: ACTIVE | Noted: 2025-02-06

## 2025-02-06 PROBLEM — Z98.890 PONV (POSTOPERATIVE NAUSEA AND VOMITING): Status: ACTIVE | Noted: 2025-02-06

## 2025-02-06 PROBLEM — M96.1 CERVICAL POST-LAMINECTOMY SYNDROME: Status: ACTIVE | Noted: 2025-02-06

## 2025-02-06 PROBLEM — R21 RASH: Status: ACTIVE | Noted: 2025-02-06

## 2025-02-06 PROBLEM — R17 JAUNDICE: Status: ACTIVE | Noted: 2019-08-18

## 2025-02-06 PROBLEM — R53.81 DEBILITY: Status: ACTIVE | Noted: 2019-08-27

## 2025-02-06 PROBLEM — J96.01 ACUTE RESPIRATORY FAILURE WITH HYPOXIA (MULTI): Status: ACTIVE | Noted: 2019-08-19

## 2025-02-06 PROBLEM — J11.1 INFLUENZA: Status: ACTIVE | Noted: 2025-02-06

## 2025-02-06 PROBLEM — L98.9 FOOT LESION: Status: ACTIVE | Noted: 2025-02-06

## 2025-02-06 PROBLEM — K85.90 ACUTE PANCREATITIS: Status: ACTIVE | Noted: 2019-08-19

## 2025-02-06 PROBLEM — M19.049 ARTHRITIS OF CARPOMETACARPAL JOINT: Status: ACTIVE | Noted: 2025-02-06

## 2025-02-06 PROBLEM — R06.02 SOB (SHORTNESS OF BREATH) ON EXERTION: Status: ACTIVE | Noted: 2025-02-06

## 2025-02-06 PROBLEM — L03.119 CELLULITIS OF ANKLE: Status: ACTIVE | Noted: 2025-02-06

## 2025-02-06 PROBLEM — R03.0 ELEVATED BLOOD PRESSURE READING WITHOUT DIAGNOSIS OF HYPERTENSION: Status: ACTIVE | Noted: 2025-02-06

## 2025-02-06 PROBLEM — K72.00 ACUTE LIVER FAILURE WITHOUT HEPATIC COMA (HHS-HCC): Status: ACTIVE | Noted: 2019-08-19

## 2025-02-06 PROBLEM — N17.9 ACUTE KIDNEY FAILURE: Status: ACTIVE | Noted: 2019-08-19

## 2025-02-06 PROBLEM — R65.21 SEVERE SEPSIS WITH SEPTIC SHOCK (MULTI): Status: ACTIVE | Noted: 2019-08-19

## 2025-02-06 PROBLEM — R11.2 PONV (POSTOPERATIVE NAUSEA AND VOMITING): Status: ACTIVE | Noted: 2025-02-06

## 2025-02-06 PROBLEM — A41.9 SEVERE SEPSIS WITH SEPTIC SHOCK (MULTI): Status: ACTIVE | Noted: 2019-08-19

## 2025-02-06 PROBLEM — K42.9 UMBILICAL HERNIA WITHOUT OBSTRUCTION OR GANGRENE: Status: ACTIVE | Noted: 2025-02-06

## 2025-02-06 PROBLEM — M19.042 OSTEOARTHRITIS OF BOTH HANDS: Status: ACTIVE | Noted: 2025-02-06

## 2025-02-06 PROBLEM — K64.9 HEMORRHOIDS: Status: ACTIVE | Noted: 2025-02-06

## 2025-02-07 ENCOUNTER — TELEPHONE (OUTPATIENT)
Dept: PRIMARY CARE | Facility: CLINIC | Age: 65
End: 2025-02-07
Payer: MEDICARE

## 2025-02-12 ENCOUNTER — APPOINTMENT (OUTPATIENT)
Dept: RADIOLOGY | Facility: CLINIC | Age: 65
End: 2025-02-12
Payer: MEDICARE

## 2025-02-19 PROBLEM — Z86.79 HISTORY OF ATRIAL FIBRILLATION: Status: RESOLVED | Noted: 2024-03-14 | Resolved: 2025-02-19

## 2025-02-19 PROBLEM — E66.811 CLASS 1 OBESITY WITH BODY MASS INDEX (BMI) OF 30.0 TO 30.9 IN ADULT: Status: ACTIVE | Noted: 2025-02-19

## 2025-02-21 ENCOUNTER — APPOINTMENT (OUTPATIENT)
Dept: CARDIOLOGY | Facility: CLINIC | Age: 65
End: 2025-02-21
Payer: MEDICARE

## 2025-03-11 DIAGNOSIS — M47.816 LUMBAR SPONDYLOSIS: ICD-10-CM

## 2025-03-12 ENCOUNTER — APPOINTMENT (OUTPATIENT)
Dept: CARDIOLOGY | Facility: CLINIC | Age: 65
End: 2025-03-12
Payer: MEDICARE

## 2025-03-12 ENCOUNTER — OFFICE VISIT (OUTPATIENT)
Dept: ORTHOPEDIC SURGERY | Facility: CLINIC | Age: 65
End: 2025-03-12
Payer: MEDICARE

## 2025-03-12 ENCOUNTER — HOSPITAL ENCOUNTER (OUTPATIENT)
Dept: RADIOLOGY | Facility: CLINIC | Age: 65
Discharge: HOME | End: 2025-03-12
Payer: MEDICARE

## 2025-03-12 VITALS
SYSTOLIC BLOOD PRESSURE: 146 MMHG | OXYGEN SATURATION: 95 % | DIASTOLIC BLOOD PRESSURE: 70 MMHG | BODY MASS INDEX: 30.2 KG/M2 | HEIGHT: 72 IN | HEART RATE: 51 BPM | WEIGHT: 223 LBS

## 2025-03-12 DIAGNOSIS — I48.0 PAROXYSMAL ATRIAL FIBRILLATION (MULTI): ICD-10-CM

## 2025-03-12 DIAGNOSIS — I10 ESSENTIAL HYPERTENSION, BENIGN: ICD-10-CM

## 2025-03-12 DIAGNOSIS — R01.1 MURMUR: ICD-10-CM

## 2025-03-12 DIAGNOSIS — I35.8 AORTIC VALVE SCLEROSIS: ICD-10-CM

## 2025-03-12 DIAGNOSIS — I80.01 THROMBOPHLEBITIS OF SUPERFICIAL VEINS OF RIGHT LOWER EXTREMITY: Primary | ICD-10-CM

## 2025-03-12 DIAGNOSIS — M48.062 NEUROGENIC CLAUDICATION DUE TO LUMBAR SPINAL STENOSIS: Primary | ICD-10-CM

## 2025-03-12 DIAGNOSIS — M47.816 LUMBAR SPONDYLOSIS: ICD-10-CM

## 2025-03-12 DIAGNOSIS — I10 PRIMARY HYPERTENSION: ICD-10-CM

## 2025-03-12 PROCEDURE — 1036F TOBACCO NON-USER: CPT | Performed by: PHYSICIAN ASSISTANT

## 2025-03-12 PROCEDURE — 72100 X-RAY EXAM L-S SPINE 2/3 VWS: CPT

## 2025-03-12 PROCEDURE — 3078F DIAST BP <80 MM HG: CPT | Performed by: PHYSICIAN ASSISTANT

## 2025-03-12 PROCEDURE — 3077F SYST BP >= 140 MM HG: CPT | Performed by: PHYSICIAN ASSISTANT

## 2025-03-12 PROCEDURE — 3008F BODY MASS INDEX DOCD: CPT | Performed by: PHYSICIAN ASSISTANT

## 2025-03-12 PROCEDURE — 99214 OFFICE O/P EST MOD 30 MIN: CPT | Performed by: PHYSICIAN ASSISTANT

## 2025-03-12 PROCEDURE — 99211 OFF/OP EST MAY X REQ PHY/QHP: CPT | Performed by: PHYSICIAN ASSISTANT

## 2025-03-12 RX ORDER — NAPROXEN SODIUM 220 MG/1
81 TABLET, FILM COATED ORAL DAILY
Qty: 30 TABLET | Refills: 11 | Status: SHIPPED | OUTPATIENT
Start: 2025-03-12 | End: 2026-03-12

## 2025-03-12 RX ORDER — LISINOPRIL 10 MG/1
10 TABLET ORAL
Qty: 90 TABLET | Refills: 3 | Status: SHIPPED | OUTPATIENT
Start: 2025-03-12

## 2025-03-12 NOTE — PROGRESS NOTES
Chief Complaint:   Establish Care (Medication )     History Of Present Illness:    Jacob Boone is a 64 y.o. male presenting with history of isolated episode of atrial fibrillation in the setting of sepsis, aortic valve sclerosis without stenosis, HTN who presents for establishment of care after the halfway of his previous cardiologist.  Patient recently underwent spinal fusion surgery, following this he developed a small, superficial painful area at the medial aspect of right gastrocnemius which is consistent with thrombophlebitis - discussed initiation of low dose ASA regimen supportively and patient verbalizes understanding.  Overall Lucius is feeling well without progressive exertional dyspnea nor classic anginal symptoms.  Patient denies chest pain, chest pressure, palpitations, dyspnea on exertion, shortness of breath at rest, diaphoresis, nausea/vomiting, back pain, headache, lightheadedness, dizziness, syncope or presyncopal episodes, active bleeding signs or symptoms, excessive weight gain, muscle or joint pain, claudication.       Last Recorded Vitals:  Vitals:    03/12/25 0941   BP: 146/70   BP Location: Left arm   Patient Position: Sitting   BP Cuff Size: Large adult   Pulse: 51   SpO2: 95%   Weight: 101 kg (223 lb)   Height: 1.829 m (6')       Past Medical History:  He has a past medical history of Atrial fibrillation and flutter (2019), Chronic lower back pain, Chronic neck pain, H/O vertigo, Heart murmur, History of sepsis, Hypertension, Migraines, PONV (postoperative nausea and vomiting), and Sleep apnea.    Past Surgical History:  He has a past surgical history that includes Neck surgery (04/26/2018); Other surgical history (12/17/2021); Cardioversion (2020); and Foot neuroma surgery (Right).      Social History:  He reports that he quit smoking about 20 years ago. His smoking use included cigars. He has never been exposed to tobacco smoke. He has never used smokeless tobacco. He reports that  he does not currently use alcohol after a past usage of about 1.0 standard drink of alcohol per week. He reports that he does not use drugs.    Family History:  No family history on file.     Allergies:  Heparin    Outpatient Medications:  Current Outpatient Medications   Medication Instructions    lisinopril 10 mg, oral, Daily RT    MAGNESIUM CITRATE ORAL 184 mg, Nightly    methocarbamol (Robaxin) 500 mg tablet Take 1-2 tabs every 8 hours as needed for spasms       Physical Exam:  Constitutional: awake and alert, oriented ×3, no apparent distress  Skin: warm, dry, good turgor no obvious lesions  Eyes: pupils equal, round, reactive to light, conjunctiva pink and noninjected, no discharge  HENT: normocephalic and atraumatic, mucous membranes moist, trachea midline with no masses/goiter  Cardiovascular: S1/S2 regular, 2/6 holosystolic murmur no rubs/gallops, no carotid bruits, no JVD  Pulmonary: symmetrical chest expansion, lungs are clear to auscultation bilaterally, no wheezes/rales/rhonchi, normal effort  Abdomen: nontender, nondistended, active bowel sounds, no ascites  Extremities: no cyanosis, clubbing, +0.5cm protrusion at medial right gastrocnemius, - Maximo sign  Neurologic: cranial nerves II - XII grossly intact, stable gait, no tremor       Last Labs:  CBC -  Lab Results   Component Value Date    WBC 12.6 (H) 01/24/2025    HGB 13.6 01/24/2025    HCT 40.1 (L) 01/24/2025    MCV 92 01/24/2025     01/24/2025       CMP -  Lab Results   Component Value Date    CALCIUM 9.0 01/24/2025    PROT 7.4 03/22/2024    ALBUMIN 4.5 03/22/2024    AST 14 03/22/2024    ALT 11 03/22/2024    ALKPHOS 52 03/22/2024    BILITOT 1.7 (H) 03/22/2024       LIPID PANEL -   Lab Results   Component Value Date    CHOL 208 (H) 03/22/2024    TRIG 102 03/22/2024    HDL 63.1 03/22/2024    CHHDL 3.3 03/22/2024    LDLF 134 (H) 02/16/2023    VLDL 20 03/22/2024    NHDL 145 03/22/2024       RENAL FUNCTION PANEL -   Lab Results   Component Value  "Date    GLUCOSE 152 (H) 01/24/2025     (L) 01/24/2025    K 4.8 01/24/2025     01/24/2025    CO2 24 01/24/2025    ANIONGAP 10 01/24/2025    BUN 19 01/24/2025    CREATININE 0.93 01/24/2025    GFRMALE >90 02/16/2023    CALCIUM 9.0 01/24/2025    ALBUMIN 4.5 03/22/2024        Lab Results   Component Value Date    HGBA1C 4.7 01/17/2025       Last Cardiology Tests:  ECG:  ECG 12 lead (Clinic Performed) 01/17/2025      Echo:  No results found for this or any previous visit from the past 1095 days.      Ejection Fractions:  No results found for: \"EF\"    Cath:  No results found for this or any previous visit from the past 1095 days.      Stress Test:  No results found for this or any previous visit from the past 1095 days.      Cardiac Imaging:  No results found for this or any previous visit from the past 1095 days.      Assessment/Plan   Problem List Items Addressed This Visit             ICD-10-CM       Cardiac and Vasculature    Paroxysmal atrial fibrillation (Multi) I48.0    Murmur R01.1    Essential hypertension, benign I10    Aortic valve sclerosis I35.8    Primary hypertension I10    Relevant Medications    lisinopril 10 mg tablet       Coag and Thromboembolic    Thrombophlebitis of superficial veins of right lower extremity - Primary I80.01       -ASA 81mg daily x 60 days, if symptoms worsen or do not resolve patient will notify my office and we will consider a venous duplex U/S    -No recurrent atrial fibrillation on prior extended Holter, AF noted during bout of sepsis    -Continue lisinopril 10mg daily    -Likely repeat 2D echo upon follow up    -RTC 1 year, sooner if issues arise in the interim        Carlos Alberto Carolina PA-C  "

## 2025-03-12 NOTE — PROGRESS NOTES
Jacob is 7 weeks postop from a L4-5 MIS TLIF performed by Dr. Stratton on 1/23.    He is recovering well from surgery.  He mentions the surgery was successful, he got his legs back.  His radicular symptoms have significantly improved.  His standing and walking stamina has also significantly improved.  Very minimal residual pain at this point.  He is getting back to most normal activities without difficulty.    On exam, normal gait without assistive devices.  Full strength in lower extremities bilaterally.  Incisions well-healed.    I personally reviewed x-rays of the lumbar spine completed today.  There is no evidence of acute fracture or hardware failure.  Stable alignment of lumbar fusion.    He can continue to increase activities as tolerated without restrictions.  I did recommend increasing his weight restriction 5 pounds per week.  We discussed the option of outpatient physical therapy, he would like to continue with home exercises.  He will follow-up with me in 2 months with repeat x-rays, lumbar AP and lateral only.    **This note was dictated using speech recognition software and was not corrected for spelling or grammatical errors**

## 2025-03-20 DIAGNOSIS — R79.89 ABNORMAL COMPLETE BLOOD COUNT: ICD-10-CM

## 2025-03-20 DIAGNOSIS — Z00.00 HEALTH MAINTENANCE EXAMINATION: ICD-10-CM

## 2025-03-20 DIAGNOSIS — I10 ESSENTIAL HYPERTENSION, BENIGN: ICD-10-CM

## 2025-03-20 DIAGNOSIS — Z13.29 ENCOUNTER FOR SCREENING FOR ENDOCRINE DISORDER: ICD-10-CM

## 2025-03-20 DIAGNOSIS — Z12.5 SCREENING FOR PROSTATE CANCER: ICD-10-CM

## 2025-03-20 DIAGNOSIS — E78.5 HYPERLIPIDEMIA, UNSPECIFIED HYPERLIPIDEMIA TYPE: ICD-10-CM

## 2025-03-27 LAB
ALBUMIN SERPL-MCNC: 4.5 G/DL (ref 3.6–5.1)
ALP SERPL-CCNC: 53 U/L (ref 35–144)
ALT SERPL-CCNC: 11 U/L (ref 9–46)
ANION GAP SERPL CALCULATED.4IONS-SCNC: 6 MMOL/L (CALC) (ref 7–17)
AST SERPL-CCNC: 16 U/L (ref 10–35)
BASOPHILS # BLD AUTO: 29 CELLS/UL (ref 0–200)
BASOPHILS NFR BLD AUTO: 0.6 %
BILIRUB SERPL-MCNC: 0.7 MG/DL (ref 0.2–1.2)
BUN SERPL-MCNC: 14 MG/DL (ref 7–25)
CALCIUM SERPL-MCNC: 9.4 MG/DL (ref 8.6–10.3)
CHLORIDE SERPL-SCNC: 104 MMOL/L (ref 98–110)
CHOLEST SERPL-MCNC: 232 MG/DL
CHOLEST/HDLC SERPL: 3.4 (CALC)
CO2 SERPL-SCNC: 28 MMOL/L (ref 20–32)
CREAT SERPL-MCNC: 0.87 MG/DL (ref 0.7–1.35)
EGFRCR SERPLBLD CKD-EPI 2021: 98 ML/MIN/1.73M2
EOSINOPHIL # BLD AUTO: 139 CELLS/UL (ref 15–500)
EOSINOPHIL NFR BLD AUTO: 2.9 %
ERYTHROCYTE [DISTWIDTH] IN BLOOD BY AUTOMATED COUNT: 12.3 % (ref 11–15)
GLUCOSE SERPL-MCNC: 99 MG/DL (ref 65–99)
HCT VFR BLD AUTO: 46.4 % (ref 38.5–50)
HDLC SERPL-MCNC: 69 MG/DL
HGB BLD-MCNC: 15.1 G/DL (ref 13.2–17.1)
LDLC SERPL CALC-MCNC: 127 MG/DL (CALC)
LYMPHOCYTES # BLD AUTO: 1728 CELLS/UL (ref 850–3900)
LYMPHOCYTES NFR BLD AUTO: 36 %
MCH RBC QN AUTO: 31.5 PG (ref 27–33)
MCHC RBC AUTO-ENTMCNC: 32.5 G/DL (ref 32–36)
MCV RBC AUTO: 96.7 FL (ref 80–100)
MONOCYTES # BLD AUTO: 437 CELLS/UL (ref 200–950)
MONOCYTES NFR BLD AUTO: 9.1 %
NEUTROPHILS # BLD AUTO: 2467 CELLS/UL (ref 1500–7800)
NEUTROPHILS NFR BLD AUTO: 51.4 %
NONHDLC SERPL-MCNC: 163 MG/DL (CALC)
PLATELET # BLD AUTO: 211 THOUSAND/UL (ref 140–400)
PMV BLD REES-ECKER: 9.5 FL (ref 7.5–12.5)
POTASSIUM SERPL-SCNC: 4.6 MMOL/L (ref 3.5–5.3)
PROT SERPL-MCNC: 7.1 G/DL (ref 6.1–8.1)
PSA SERPL-MCNC: 4.78 NG/ML
RBC # BLD AUTO: 4.8 MILLION/UL (ref 4.2–5.8)
SODIUM SERPL-SCNC: 138 MMOL/L (ref 135–146)
TRIGL SERPL-MCNC: 215 MG/DL
TSH SERPL-ACNC: 1 MIU/L (ref 0.4–4.5)
WBC # BLD AUTO: 4.8 THOUSAND/UL (ref 3.8–10.8)

## 2025-03-28 ENCOUNTER — APPOINTMENT (OUTPATIENT)
Dept: PRIMARY CARE | Facility: CLINIC | Age: 65
End: 2025-03-28
Payer: MEDICARE

## 2025-03-28 ENCOUNTER — TELEPHONE (OUTPATIENT)
Dept: PRIMARY CARE | Facility: CLINIC | Age: 65
End: 2025-03-28

## 2025-03-28 VITALS
DIASTOLIC BLOOD PRESSURE: 78 MMHG | BODY MASS INDEX: 30.97 KG/M2 | TEMPERATURE: 98.4 F | RESPIRATION RATE: 18 BRPM | HEIGHT: 71 IN | HEART RATE: 57 BPM | OXYGEN SATURATION: 96 % | WEIGHT: 221.2 LBS | SYSTOLIC BLOOD PRESSURE: 112 MMHG

## 2025-03-28 DIAGNOSIS — Z00.00 HEALTH MAINTENANCE EXAMINATION: Primary | ICD-10-CM

## 2025-03-28 ASSESSMENT — PATIENT HEALTH QUESTIONNAIRE - PHQ9
2. FEELING DOWN, DEPRESSED OR HOPELESS: NOT AT ALL
1. LITTLE INTEREST OR PLEASURE IN DOING THINGS: NOT AT ALL
SUM OF ALL RESPONSES TO PHQ9 QUESTIONS 1 AND 2: 0

## 2025-03-28 ASSESSMENT — ACTIVITIES OF DAILY LIVING (ADL)
BATHING: INDEPENDENT
GROCERY_SHOPPING: INDEPENDENT
DOING_HOUSEWORK: INDEPENDENT
TAKING_MEDICATION: INDEPENDENT
MANAGING_FINANCES: INDEPENDENT
DRESSING: INDEPENDENT

## 2025-03-28 ASSESSMENT — LIFESTYLE VARIABLES
HOW OFTEN DO YOU HAVE A DRINK CONTAINING ALCOHOL: 2-3 TIMES A WEEK
AUDIT-C TOTAL SCORE: 3
HOW MANY STANDARD DRINKS CONTAINING ALCOHOL DO YOU HAVE ON A TYPICAL DAY: 1 OR 2
SKIP TO QUESTIONS 9-10: 1
HOW OFTEN DO YOU HAVE SIX OR MORE DRINKS ON ONE OCCASION: NEVER

## 2025-03-28 NOTE — TELEPHONE ENCOUNTER
Please fax patient demographics, insurance information, last office visit note, and labs to [facility] for screening colonoscopy

## 2025-03-28 NOTE — PROGRESS NOTES
"Subjective   Patient ID: Jacob Boone is a 64 y.o. male who presents for Annual Exam (Medicare wellness, bw done, no concerns).  HPI  Feels well, no specific complaints or concerns today.    Had back surgery 1/23/25 due to lumbar spinal stenosis  by Dr. Fredy Stratton     Recently saw LAZARO Chávez- takes lisinopril, h/o bout of afib when he was septic    Colonoscopy - pt reports 5 years ago   Review of Systems  General: no fever or night sweats, no change in weight  Eyes: no vision disturbance  ENT: no mouth lesions, no sore throat, and no dysphagia  CV: no chest pain, no palpitations, no lower extremity edema  Resp: no shortness of breath, no cough  GI: no abdominal pain, no change in bowel habits  : no urinary problems  MSK: no arthralgias, myalgias, or back pain  Skin; sees dermatologist every 6 mos  Neuro: no headache, no difficulty walking     Objective   /78   Pulse 57   Temp 36.9 °C (98.4 °F)   Resp 18   Ht 1.797 m (5' 10.75\")   Wt 100 kg (221 lb 3.2 oz)   SpO2 96%   BMI 31.07 kg/m²    Physical Exam  Appears well.    HEENT: OP clear. Sclera white. PERRL. EACs and TMs clear.    Neck: Supple, no masses, lymphadenopathy, or thyromegaly.    CVS: RRR, no murmurs. No peripheral edema.    Resp: Clear and equal breath sounds.     Abd: Soft, non-tender, no mass or HSM.    Male: Normal testes. No hernia.    Rectal: No mass. Guaiac negative stool. Normal prostate.    Skin: No suspicious lesions.      Reviewed recent labs:  HDL 69   PSA 4.78  Assessment/Plan   Diagnoses and all orders for this visit:  Health maintenance examination      Discussed elevated cholesterol- pt will work on diet and exercise     Referral to [facility] for screening colonoscopy    Referral to  Urology for elevated PSA     Anaid Thorpe MD  Family Medicine   [facility]  "

## 2025-05-12 DIAGNOSIS — M48.062 SPINAL STENOSIS OF LUMBAR REGION WITH NEUROGENIC CLAUDICATION: ICD-10-CM

## 2025-05-14 ENCOUNTER — OFFICE VISIT (OUTPATIENT)
Dept: ORTHOPEDIC SURGERY | Facility: CLINIC | Age: 65
End: 2025-05-14
Payer: MEDICARE

## 2025-05-14 ENCOUNTER — HOSPITAL ENCOUNTER (OUTPATIENT)
Dept: RADIOLOGY | Facility: CLINIC | Age: 65
Discharge: HOME | End: 2025-05-14
Payer: MEDICARE

## 2025-05-14 DIAGNOSIS — M48.062 NEUROGENIC CLAUDICATION DUE TO LUMBAR SPINAL STENOSIS: Primary | ICD-10-CM

## 2025-05-14 DIAGNOSIS — M48.062 SPINAL STENOSIS OF LUMBAR REGION WITH NEUROGENIC CLAUDICATION: ICD-10-CM

## 2025-05-14 PROCEDURE — 1036F TOBACCO NON-USER: CPT | Performed by: PHYSICIAN ASSISTANT

## 2025-05-14 PROCEDURE — 99211 OFF/OP EST MAY X REQ PHY/QHP: CPT | Performed by: PHYSICIAN ASSISTANT

## 2025-05-14 PROCEDURE — 72100 X-RAY EXAM L-S SPINE 2/3 VWS: CPT | Performed by: RADIOLOGY

## 2025-05-14 PROCEDURE — 72100 X-RAY EXAM L-S SPINE 2/3 VWS: CPT

## 2025-05-14 NOTE — PROGRESS NOTES
Jacob is 3 months postop from a L4-5 MIS TLIF performed by Dr. Stratton on 1/23.    He has continued to recover well from surgery.  He is feeling great.  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  He ended up going to the chiropractor and after 2 weeks he was feeling fine again.  He has minimal residual back pain.  Radicular symptoms have resolved.  He is very pleased with his results from surgery.    On exam, normal gait without assistive devices.  Full strength in lower extremities bilaterally.  Incisions well-healed.    I personally reviewed x-rays of the lumbar spine completed today.  There is no evidence of acute fracture or hardware failure.  Stable alignment of lumbar fusion.    He can continue to increase activities as tolerated without restrictions.  He does not need refills medications today.  He will follow-up with me on an as-needed basis.    **This note was dictated using speech recognition software and was not corrected for spelling or grammatical errors**

## 2026-03-03 ENCOUNTER — APPOINTMENT (OUTPATIENT)
Dept: CARDIOLOGY | Facility: CLINIC | Age: 66
End: 2026-03-03
Payer: MEDICARE

## 2026-03-30 ENCOUNTER — APPOINTMENT (OUTPATIENT)
Dept: PRIMARY CARE | Facility: CLINIC | Age: 66
End: 2026-03-30
Payer: MEDICARE

## (undated) DEVICE — Device

## (undated) DEVICE — SLEEVE, VASO PRESS, CALF GARMENT, MEDIUM, GREEN

## (undated) DEVICE — DRESSING, TRANSPARENT, TEGADERM, 8 X 12 IN

## (undated) DEVICE — SUTURE, VICRYL, 2-0, 27 IN, FS-1, UNDYED

## (undated) DEVICE — GLOVES, SURGICAL, DERMASURE, SZ 6.5, DARK GREEN

## (undated) DEVICE — KIT, PATIENT CARE, PRONEVIEW CUSHION

## (undated) DEVICE — GLOVE, SURGICAL, PROTEXIS PI , 8.0, PF, LF

## (undated) DEVICE — GLOVE, SURGICAL, PROTEXIS PI , 6.5, PF, LF

## (undated) DEVICE — SUTURE, STRATAFIX PDS PLUS, 1, OS-6, SYMMETRIC 45CM, VIOLET

## (undated) DEVICE — COVER, C-ARM W/CLIPS, OEC GE

## (undated) DEVICE — DRAPE PACK, UNIVERSAL II

## (undated) DEVICE — SPONGE, LAP, XRAY DECT, 18IN X 18IN, W/MASTER DMT, STERILE

## (undated) DEVICE — MARKER, SKIN, DUAL TIP INK W/9 LABEL AND REMOVABLE TIME OUT SLEEVE

## (undated) DEVICE — SUTURE, STRATAFIX, 3-0 MONOCRYL PLUS, PS-2 SPIRAL UNDYED

## (undated) DEVICE — APPLICATOR, CHLORAPREP, W/ORANGE TINT, 26ML

## (undated) DEVICE — HEMOSTATIC, MATRIX, SURGIFLO, 8ML

## (undated) DEVICE — CLOSURE SYSTEM, DERMABOND, PRINEO, 22CM, STERILE

## (undated) DEVICE — ELECTRODE, ELECTROSURGICAL, BLADE, EXTENDED

## (undated) DEVICE — BUR, 4.0MM ROUND CARBIDE

## (undated) DEVICE — DRAPE, SHEET, UTILITY, NON ABSORBENT, 18 X 26 IN, LF

## (undated) DEVICE — SOLUTION, IRRIGATION, X RX SODIUM CHL 0.9%, 1000ML BTL

## (undated) DEVICE — DRAPE, INSTRUMENT, W/POUCH, STERI DRAPE, 7 X 11 IN, DISPOSABLE, STERILE

## (undated) DEVICE — SEALER, BIPOLAR, AQUA MANTYS 6.0

## (undated) DEVICE — GLOVE, SURGICAL, PROTEXIS PI , 7.5, PF, LF